# Patient Record
Sex: FEMALE | Race: OTHER | NOT HISPANIC OR LATINO | Employment: UNEMPLOYED | URBAN - METROPOLITAN AREA
[De-identification: names, ages, dates, MRNs, and addresses within clinical notes are randomized per-mention and may not be internally consistent; named-entity substitution may affect disease eponyms.]

---

## 2020-07-10 LAB
C TRACH DNA GENITAL QL NAA+PROBE: NON REACTIVE
HBV SURFACE AG SERPL QL IA: NEGATIVE
RUBV IGG SERPL IA-ACNC: NORMAL
TREPONEMA PALLIDUM IGG+IGM AB [PRESENCE] IN SERUM OR PLASMA BY IMMUNOASSAY: NON REACTIVE

## 2020-09-22 ENCOUNTER — APPOINTMENT (OUTPATIENT)
Dept: OBGYN | Facility: CLINIC | Age: 30
End: 2020-09-22

## 2020-10-05 ENCOUNTER — INITIAL PRENATAL (OUTPATIENT)
Dept: OBGYN | Facility: CLINIC | Age: 30
End: 2020-10-05

## 2020-10-05 VITALS
DIASTOLIC BLOOD PRESSURE: 70 MMHG | BODY MASS INDEX: 34.04 KG/M2 | WEIGHT: 185 LBS | SYSTOLIC BLOOD PRESSURE: 112 MMHG | HEIGHT: 62 IN

## 2020-10-05 DIAGNOSIS — O09.92 ENCOUNTER FOR SUPERVISION OF HIGH RISK PREGNANCY IN SECOND TRIMESTER, ANTEPARTUM: Primary | ICD-10-CM

## 2020-10-05 DIAGNOSIS — Z98.891 HISTORY OF CESAREAN SECTION: ICD-10-CM

## 2020-10-05 PROCEDURE — 8198 PREG CTR PKG RATE (SYSTEM): Performed by: NURSE PRACTITIONER

## 2020-10-05 PROCEDURE — 0500F INITIAL PRENATAL CARE VISIT: CPT | Performed by: NURSE PRACTITIONER

## 2020-10-05 ASSESSMENT — ENCOUNTER SYMPTOMS
RESPIRATORY NEGATIVE: 1
MUSCULOSKELETAL NEGATIVE: 1
CARDIOVASCULAR NEGATIVE: 1
GASTROINTESTINAL NEGATIVE: 1
CONSTITUTIONAL NEGATIVE: 1
PSYCHIATRIC NEGATIVE: 1
EYES NEGATIVE: 1
NEUROLOGICAL NEGATIVE: 1

## 2020-10-05 NOTE — LETTER
"Count Your Baby's Movements  Another step to a healthy delivery    Lay Cooper             Dept: [phone]    How Many Weeks Pregnant? 27w2d    Date to Begin Counting: 10/5/2020              How to use this chart    One way for your physician to keep track of your baby's health is by knowing how often the baby moves (or \"kicks\") in your womb.  You can help your physician to do this by using this chart every day.    Every day, you should see how many hours it takes for your baby to move 10 times.  Start in the morning, as soon as you get up.    · First, write down the time your baby moves until you get to 10.  · Check off one box every time your baby moves until you get to 10.  · Write down the time you finished counting in the last column.  · Total how long it took to count up all 10 movements.  · Finally, fill in the box that shows how long this took.  After counting 10 movements, you no longer have to count any more that day.  The next morning, just start counting again as soon as you get up.    What should you call a \"movement\"?  It is hard to say, because it will feel different from one mother to another and from one pregnancy to the next.  The important thing is that you count the movements the same way throughout your pregnancy.  If you have more questions, you should ask your physician.    Count carefully every day!  SAMPLE:  Week 28    How many hours did it take to feel 10 movements?       Start  Time     1     2     3     4     5     6     7     8     9     10   Finish Time   Mon 8:20 ·  ·  ·  ·  ·  ·  ·  ·  ·  ·  11:40   Tue Wed Thu               Fri               Sat               Sun                 IMPORTANT: You should contact your physician if it takes more than two hours for you to feel 10 movements.  Each morning, write down the time and start to count the movements of your baby.  Keep track by checking off one box every time you feel one movement.  When you have felt 10 " "\"kicks\", write down the time you finished counting in the last column.  Then fill in the   box (over the check michael) for the number of hours it took.  Be sure to read the complete instructions on the previous page.            "

## 2020-10-05 NOTE — PROGRESS NOTES
Pt. Here for NOB visit today.  # [phone]  First prenatal care  Pt. states she is having lower abdominal pain   +FM   Pharmacy verified  AFP, already done   Chaperone offered and not indicated  Last PAP: 7/10/2020, WNL   Pt given VALERIE sheet and instructions  Pt declines BTL  Pt declines TDAP vaccine   Pt declines Flu vaccine

## 2020-10-05 NOTE — PROGRESS NOTES
"Subjective:      S:  Lay Cooper is a 30 y.o. female  @ She is 27w2d with an WARREN of Estimated Date of Delivery: 21 based off of US who presents for her new OB exam.      Her OB hx includes C/S x1 for maternal fatigue in 2nd stage. Unknown scar  History of HSV I or II in self or partner: no  History of STIs in self or partner: no  History of Thyroid problems: no  Hx of positive quantiferon, negative chest xrays. Pt was exposed years ago by older relative    Previous care in this pregnancy in [place], records in media. She reports tiredness and low abd pain.  Neg AFP.  Declines CF.  Reports positive FM, no VB, LOF, or cramping.  Denies dysuria, vaginal DC.  Pt is  and lives with FOB. FOB is suppoortive. She is currently working as homemaker, no heavy lifting, no chemical exposure.  Pregnancy is planned.    O:    Vitals:    10/05/20 0823   BP: 112/70   Weight: 83.9 kg (185 lb)   Height: 1.575 m (5' 2\")    See H&P Prenatal Physical.  Wet mount: not indicated        FHTs: 145        Fundal ht: 31cm        S>D     A:   1.  IUP @ 27w2d WARREN: Estimated Date of Delivery: 21 per US         2.  S=D        3.    Patient Active Problem List    Diagnosis Date Noted   • Encounter for supervision of high risk pregnancy in second trimester, antepartum 10/05/2020   • History of  section, no operative report 10/05/2020         P:  1.  GC/CT neg. Pap neg.         2. 3 T labs ordered - lab slip given        3.  Discussed diet and exercise during pregnancy. Encouraged good nutrition, and daily exercise including walking or swimming. Discussed expected weight gain during pregnancy.              4.  Discussed adequate hydration during pregnancy.        5.  NOB packet given        6.  Return to office in 2 wks with physician        7.  Growth scan for S>D        8.  Pregnancy guide providsied        9.  Childbirth education discussed. Not a candidate for Centering Pregnancy       10. Declines flu vaccine       11. " "Declines TDAP       12. Kick count infor provided       13. Declines BTL  HPI    Review of Systems   Constitutional: Negative.    HENT: Negative.    Eyes: Negative.    Respiratory: Negative.    Cardiovascular: Negative.    Gastrointestinal: Negative.    Genitourinary: Negative.    Musculoskeletal: Negative.    Skin: Negative.    Neurological: Negative.    Endo/Heme/Allergies: Negative.    Psychiatric/Behavioral: Negative.           Objective:     /70   Ht 1.575 m (5' 2\")   Wt 83.9 kg (185 lb)   LMP 2020   BMI 33.84 kg/m²      Physical Exam  Vitals signs and nursing note reviewed.   Constitutional:       Appearance: She is well-developed.   Neck:      Musculoskeletal: Normal range of motion and neck supple.   Cardiovascular:      Rate and Rhythm: Normal rate and regular rhythm.      Heart sounds: Normal heart sounds.   Pulmonary:      Effort: Pulmonary effort is normal.      Breath sounds: Normal breath sounds.   Abdominal:      Palpations: Abdomen is soft.   Genitourinary:     Vagina: Normal.      Comments: Uterus enlarged, c/w 31 wk ga  Musculoskeletal: Normal range of motion.   Skin:     General: Skin is warm and dry.   Neurological:      Mental Status: She is alert and oriented to person, place, and time.      Deep Tendon Reflexes: Reflexes are normal and symmetric.   Psychiatric:         Behavior: Behavior normal.         Thought Content: Thought content normal.         Judgment: Judgment normal.                 Assessment/Plan:        1. Encounter for supervision of high risk pregnancy in second trimester, antepartum    - CBC WITHOUT DIFFERENTIAL; Future  - GLUCOSE 1HR GESTATIONAL; Future  - TREPONEMA PALLIDUM ANTIBODY, IGG BY IFA (CSF); Future  - US-OB LIMITED GROWTH FOLLOW UP; Future    2. History of  section, no operative reportr      "

## 2020-10-12 ENCOUNTER — APPOINTMENT (OUTPATIENT)
Dept: RADIOLOGY | Facility: IMAGING CENTER | Age: 30
End: 2020-10-12
Attending: NURSE PRACTITIONER

## 2020-10-12 DIAGNOSIS — O09.92 ENCOUNTER FOR SUPERVISION OF HIGH RISK PREGNANCY IN SECOND TRIMESTER, ANTEPARTUM: ICD-10-CM

## 2020-10-12 PROCEDURE — 76816 OB US FOLLOW-UP PER FETUS: CPT | Mod: TC | Performed by: NURSE PRACTITIONER

## 2020-10-14 PROBLEM — O36.63X0 EXCESSIVE FETAL GROWTH AFFECTING MANAGEMENT OF PREGNANCY IN THIRD TRIMESTER: Status: ACTIVE | Noted: 2020-10-14

## 2020-10-19 ENCOUNTER — ROUTINE PRENATAL (OUTPATIENT)
Dept: OBGYN | Facility: CLINIC | Age: 30
End: 2020-10-19

## 2020-10-19 VITALS — WEIGHT: 187.3 LBS | BODY MASS INDEX: 34.26 KG/M2 | DIASTOLIC BLOOD PRESSURE: 78 MMHG | SYSTOLIC BLOOD PRESSURE: 100 MMHG

## 2020-10-19 DIAGNOSIS — Z34.83 ENCOUNTER FOR SUPERVISION OF OTHER NORMAL PREGNANCY IN THIRD TRIMESTER: ICD-10-CM

## 2020-10-19 PROCEDURE — 90715 TDAP VACCINE 7 YRS/> IM: CPT | Performed by: OBSTETRICS & GYNECOLOGY

## 2020-10-19 PROCEDURE — 90040 PR PRENATAL FOLLOW UP: CPT | Performed by: OBSTETRICS & GYNECOLOGY

## 2020-10-19 PROCEDURE — 90686 IIV4 VACC NO PRSV 0.5 ML IM: CPT | Performed by: OBSTETRICS & GYNECOLOGY

## 2020-10-19 PROCEDURE — 90472 IMMUNIZATION ADMIN EACH ADD: CPT | Performed by: OBSTETRICS & GYNECOLOGY

## 2020-10-19 PROCEDURE — 90471 IMMUNIZATION ADMIN: CPT | Performed by: OBSTETRICS & GYNECOLOGY

## 2020-10-19 NOTE — PROGRESS NOTES
OB follow up   + fetal movement.  No VB, LOF or UC's.  Tdap tody   Flu vaccine given today Right Deltoid. Screening checklist reviewed with patient. VIS given. Verified by MR  Pt desires  to discuss with  today  Phone # [phone]  Preferred pharmacy confirmed.

## 2020-10-19 NOTE — PROGRESS NOTES
TACOS:  29w2d    Pt reports doing well.  Denies vaginal bleeding, contractions, LOF.  Reports +FM.      /78   Wt 85 kg (187 lb 4.8 oz)   LMP 2020   BMI 34.26 kg/m²   gen: AAO, NAD  FHTs: 125  FH: 29    A/P: 30 y.o.  @ 29w2d      Estimated Date of Delivery: 21 by lmp c/w 11wk US (media)    PNL: Rh+/-, RI, pnl wnl    Aneuploidy screening: none  Anatomy US: normal, post placenta   US 10/12 for S>D: EFW 81.5%    Glucola/3rd tri labs: [ ]   Rhogam: [ ]     Tdap: [ ]  Flu vacccine [ ]     GBS [ ]       Latent TB: + quantiferon with PNL, CXR neg    Hx of c/s x1: 2nd stage arrest        Today, I discussed with Lay Cooper her history of  delivery.  Pt reports she was 36wks and spontaneously labored. She was admitted for labor but reports very little support. She reports she changed from 3.5 to 6cm over approximatly 4hrs then was offered a c/s and accepted because she was worried about waiting longer.  Reports she was told the baby's heart rate was normal and does not think she remained 6cm for a prolonged period of time.  Never made it to 10cm.  That baby weight 8thi9yf.  Pt denies being told that she should never try for a vaginal delivery in future pregnancies.  We discussed the risks/benefits of TOLAC, including uterine rupture (approximately 0.7%) with need for emergent  delivery, risk of maternal hemorrhage requiring blood transfusion or, rarely, hysterectomy, risk of fetal death or permanent hypoxic brain injury) vs repeat  delivery including surgical risks (damage to intraabdominal structures, bleeding, infection, pain), possible need for  delivery in future pregnancies, risk of adherent placenta in future pregnancies.    MFMU calculator chance of successful : 70%  After our discussion, pt would like to TOLAC    Pt expressed desire for female only delivery provider - discussed we do have men in our group of physicians and cannot guarantee this for any of our  patients.  Pt expressed understanding.  Tdap/flu today  Has rx for 3rd tri labs asap      RTC 2 wks    Angella Fonseca MD  [facility], Women's Health

## 2020-11-02 ENCOUNTER — ROUTINE PRENATAL (OUTPATIENT)
Dept: OBGYN | Facility: CLINIC | Age: 30
End: 2020-11-02

## 2020-11-02 VITALS — DIASTOLIC BLOOD PRESSURE: 68 MMHG | SYSTOLIC BLOOD PRESSURE: 112 MMHG | BODY MASS INDEX: 34.61 KG/M2 | WEIGHT: 189.2 LBS

## 2020-11-02 DIAGNOSIS — Z98.891 HISTORY OF CESAREAN SECTION: ICD-10-CM

## 2020-11-02 PROCEDURE — 90040 PR PRENATAL FOLLOW UP: CPT | Performed by: NURSE PRACTITIONER

## 2020-11-02 RX ORDER — ACETAMINOPHEN 325 MG/1
650 TABLET ORAL EVERY 4 HOURS PRN
Status: ON HOLD | COMMUNITY
End: 2020-12-22

## 2020-11-02 NOTE — PROGRESS NOTES
SUBJECTIVE:  Pt is a 30 y.o.   at 31w2d  gestation. Presents today for follow-up prenatal care.  Reports good  fetal movement. Denies regular cramping/contractions, bleeding or leaking of fluid. Denies dysuria, headaches, N/V. Generally feels well today except restless legs at night, heartburn, fatigue, and dyspnea on exertion. Pt described many symptoms that align with anemia, however she has not yet done her third trimester labs.     OBJECTIVE:  - See prenatal vitals flow  -   Vitals:    20 1025   BP: 112/68   Weight: 85.8 kg (189 lb 3.2 oz)                 ASSESSMENT:   - IUP at 31w2d    - S=D   -   Patient Active Problem List    Diagnosis Date Noted   • LGA in 3rd trimester, EFW Percentile: 81.5% on 10/12/20 10/14/2020   • Encounter for supervision of high risk pregnancy in second trimester, antepartum 10/05/2020   • History of  section, no operative report 10/05/2020         PLAN:  - Possible anemia: pt was provided education on iron and anemia and how that can cause the symptoms she is describing. Pt was encouraged to do her labs so that we can have a better understanding of what might be causing her fatigue and dyspnea on exertion. Provided handout on iron-rich foods to include into her diet as well.   - Heartburn: Provided education on heartburn. Lifestyle measures, pharm measures, and holistic measures discussed to reduce, and handouts provided for each.    -Restless leg: encouraged adequate water intake, potassium intake from diet, stretches demonstrated, encouraged warm epsom salt baths, and benadryl as a last resort can be used, but sparingly.    - S/sx pregnancy and labor warning signs vs general discomforts discussed  - Fetal movements and/or kick counts reviewed   - Adequate hydration reinforced  - Nutrition/exercise/vitamin education; continue PNV  - Plans for breast feeding.   - Coping with labor pains reviewed and resources provided   - S/p Tdap vacc   - S/p Flu vacc   -  Anticipatory guidance given  - RTC in 2 weeks for follow-up prenatal care

## 2020-11-02 NOTE — PROGRESS NOTES
.Pt. Here for OB/FU. Reports Good FM.   Good # [phone]  Pt. Denies VB, LOF, or UC's.   Pharmacy verified.   Chaperone offered and not indicated  Pt states that she has been having difficulty breathing

## 2020-11-16 ENCOUNTER — ROUTINE PRENATAL (OUTPATIENT)
Dept: OBGYN | Facility: CLINIC | Age: 30
End: 2020-11-16

## 2020-11-16 VITALS — BODY MASS INDEX: 34.82 KG/M2 | DIASTOLIC BLOOD PRESSURE: 60 MMHG | SYSTOLIC BLOOD PRESSURE: 108 MMHG | WEIGHT: 190.4 LBS

## 2020-11-16 DIAGNOSIS — Z34.83 ENCOUNTER FOR SUPERVISION OF OTHER NORMAL PREGNANCY IN THIRD TRIMESTER: Primary | ICD-10-CM

## 2020-11-16 PROCEDURE — 90040 PR PRENATAL FOLLOW UP: CPT | Performed by: NURSE PRACTITIONER

## 2020-11-16 NOTE — PROGRESS NOTES
S) Pt is a 30 y.o.   at 33w2d  gestation. Routine prenatal care today. Had a couple of BH tightenings but nothing regular or painful.    Fetal movement Normal  Cramping no  VB no  LOF no   Denies dysuria. Generally feels well today. Good self-care activities identified. Denies headaches, swelling, visual changes, or epigastric pain .     O) See flow sheet for vital signs and fetal measurements.          Labs:       PNL: WNL       GCT: has not done       AFP: Not Examined       GBS: N/A       Pertinent ultrasound - 10/12/0 Growth 81.5%            A) IUP at 33w2d       S=D         Patient Active Problem List    Diagnosis Date Noted   • LGA in 3rd trimester, EFW Percentile: 81.5% on 10/12/20 10/14/2020   • Encounter for supervision of high risk pregnancy in second trimester, antepartum 10/05/2020   • History of  section, no operative report 10/05/2020          SVE: deferred         TDAP: yes       FLU: yes        BTL: no       : yes       C/S Consent: no       IOL or C/S scheduled: no               P) s/s ptl vs general discomforts. Fetal movements reviewed. General ed and anticipatory guidance. Nutrition/exercise/vitamin. Plans breast Plans pp contraception- unsure  Continue PNV.   Discussed normalcy of BH ctx in third trimester and when to go to hospital.   Reprinted gtt and encouraged to do ASAP  RTC 2 weeks or PRN

## 2020-11-16 NOTE — PROGRESS NOTES
OB follow up   + fetal movement. Active  No VB, LOF or UC's.  Flu vaccine offered Recieved  Phone # [phone]  Preferred pharmacy confirmed.  No complaints as of today

## 2020-11-23 ENCOUNTER — HOSPITAL ENCOUNTER (OUTPATIENT)
Dept: LAB | Facility: MEDICAL CENTER | Age: 30
End: 2020-11-23
Attending: NURSE PRACTITIONER
Payer: COMMERCIAL

## 2020-11-23 DIAGNOSIS — O09.92 ENCOUNTER FOR SUPERVISION OF HIGH RISK PREGNANCY IN SECOND TRIMESTER, ANTEPARTUM: ICD-10-CM

## 2020-11-23 PROBLEM — O99.019 ANEMIA IN PREGNANCY: Status: ACTIVE | Noted: 2020-11-23

## 2020-11-23 LAB
ERYTHROCYTE [DISTWIDTH] IN BLOOD BY AUTOMATED COUNT: 42.9 FL (ref 35.9–50)
GLUCOSE 1H P 50 G GLC PO SERPL-MCNC: 133 MG/DL (ref 70–139)
HCT VFR BLD AUTO: 29.2 % (ref 37–47)
HGB BLD-MCNC: 8.7 G/DL (ref 12–16)
MCH RBC QN AUTO: 21.8 PG (ref 27–33)
MCHC RBC AUTO-ENTMCNC: 29.8 G/DL (ref 33.6–35)
MCV RBC AUTO: 73.2 FL (ref 81.4–97.8)
PLATELET # BLD AUTO: 277 K/UL (ref 164–446)
PMV BLD AUTO: 11.1 FL (ref 9–12.9)
RBC # BLD AUTO: 3.99 M/UL (ref 4.2–5.4)
TREPONEMA PALLIDUM IGG+IGM AB [PRESENCE] IN SERUM OR PLASMA BY IMMUNOASSAY: NORMAL
WBC # BLD AUTO: 8 K/UL (ref 4.8–10.8)

## 2020-11-24 ENCOUNTER — TELEPHONE (OUTPATIENT)
Dept: OBGYN | Facility: CLINIC | Age: 30
End: 2020-11-24

## 2020-11-25 NOTE — TELEPHONE ENCOUNTER
----- Message from LEANN Santiago sent at 11/24/2020 10:48 AM PST -----  Pt has anemia, will need to begin iron supplements, take with orange juice for better absorption, should avoid taking milk, calcium or antacids at the same time.      Pt notified of need to start on Iron supplementation to take 325 mg BID. Recommended to take it with orange juice, to avoid milk products, calcium or antacids 1hr before and 2hr after. Not to take with PNV. To increase water intake to decrease side effects of constipation. Pt verbalized understanding.

## 2020-12-01 ENCOUNTER — ROUTINE PRENATAL (OUTPATIENT)
Dept: OBGYN | Facility: CLINIC | Age: 30
End: 2020-12-01

## 2020-12-01 VITALS — DIASTOLIC BLOOD PRESSURE: 70 MMHG | BODY MASS INDEX: 36.4 KG/M2 | WEIGHT: 199 LBS | SYSTOLIC BLOOD PRESSURE: 116 MMHG

## 2020-12-01 DIAGNOSIS — O09.92 ENCOUNTER FOR SUPERVISION OF HIGH RISK PREGNANCY IN SECOND TRIMESTER, ANTEPARTUM: ICD-10-CM

## 2020-12-01 PROCEDURE — 90040 PR PRENATAL FOLLOW UP: CPT | Performed by: PHYSICIAN ASSISTANT

## 2020-12-01 NOTE — PROGRESS NOTES
Pt here today for OB follow up  Pt states she is feeling heavy   Reports +FM  Good # [phone]  Pharmacy Confirmed.  Chaperone offered and not indicated.

## 2020-12-01 NOTE — PROGRESS NOTES
"Pt has no complaints with cramping, UCs, VB, LOF, though pt has had some UCs over past week. +FM. GBS next visit. PTL precautions reviewed as pt has hx of PTL at 36wk. Pt had C/S due to FTP and \"maternal exhaustion\" with 14 hours of labor, desires TOLAC. D/w pt plan for delivery, though no op note in media. Pt has seen Dr Sethi in early pregnancy and was approved for TOLAC. Daily FKC recommended and pt urged to incr water intake, as she is only \"drinking water when thirsty now.\" Also, pt using Prep H with good relief for hemorrhoids over past week, denies constipation. RTC 1 wk or sooner prn.   "

## 2020-12-10 ENCOUNTER — ROUTINE PRENATAL (OUTPATIENT)
Dept: OBGYN | Facility: CLINIC | Age: 30
End: 2020-12-10

## 2020-12-10 ENCOUNTER — HOSPITAL ENCOUNTER (OUTPATIENT)
Facility: MEDICAL CENTER | Age: 30
End: 2020-12-10
Attending: NURSE PRACTITIONER
Payer: COMMERCIAL

## 2020-12-10 VITALS — SYSTOLIC BLOOD PRESSURE: 132 MMHG | DIASTOLIC BLOOD PRESSURE: 84 MMHG | BODY MASS INDEX: 37.22 KG/M2 | WEIGHT: 203.5 LBS

## 2020-12-10 DIAGNOSIS — O09.92 ENCOUNTER FOR SUPERVISION OF HIGH RISK PREGNANCY IN SECOND TRIMESTER, ANTEPARTUM: ICD-10-CM

## 2020-12-10 DIAGNOSIS — O09.92 ENCOUNTER FOR SUPERVISION OF HIGH RISK PREGNANCY IN SECOND TRIMESTER, ANTEPARTUM: Primary | ICD-10-CM

## 2020-12-10 DIAGNOSIS — Z98.891 HISTORY OF CESAREAN SECTION: ICD-10-CM

## 2020-12-10 PROCEDURE — 90040 PR PRENATAL FOLLOW UP: CPT | Performed by: NURSE PRACTITIONER

## 2020-12-10 NOTE — PATIENT INSTRUCTIONS
P:  1.  GBS obtained.          2.  Labor precautions given.  Instructions given on where to go.  Pt receptive to              education.          3.  Questions answered.          4.  Continue FKCs.          5.  Encouraged adequate water intake        6.  F/u 1 wk.        7.  Repeat CS ordered.        8.  L&D policies reviewed w pt.         9.  IOL/RCS message placed to schedulers for WARREN.

## 2020-12-10 NOTE — PROGRESS NOTES
S:  Pt is  at 36w5d here for routine OB follow up.  Reports some skin irritation when she used the hair removing cream this morning.  Also reports her hands and feet are falling asleep, swollen.  Reports that she feels occ UCs.  Reports good FM.  Denies VB, LOF, RUCs, or vaginal DC.     O:    Vitals:    12/10/20 1105   BP: 132/84   Weight: 92.3 kg (203 lb 8 oz)           FHTs: 150        Fundal ht: 38 cm.        Fetal position: vertex.    A:  IUP at 36w5d  Patient Active Problem List    Diagnosis Date Noted   • Anemia in pregnancy 2020   • LGA in 3rd trimester, EFW Percentile: 81.5% on 10/12/20 10/14/2020   • Encounter for supervision of high risk pregnancy in second trimester, antepartum 10/05/2020   • History of C/S, no operative report - desires TOLAC 10/05/2020       P:  1.  GBS obtained.          2.  Labor precautions given.  Instructions given on where to go.  Pt receptive to              education.          3.  Questions answered.          4.  Continue FKCs.          5.  Encouraged adequate water intake        6.  F/u 1 wk.        7.  Repeat CS ordered.        8.  L&D policies reviewed w pt.         9.  IOL/RCS message placed to schedulers for WARREN.        10.  D/w pt helps for skin irritation, discouraged hair removal cream in the future.    Chaperone offered and provided by Viky Frederick MA.

## 2020-12-10 NOTE — PROGRESS NOTES
Pt. Here for OB/FU. Reports Good FM.   Good # [phone]  Pt. Denies VB, LOF, or UC's.   Pharmacy verified.   Chaperone offered and indicated  Pt states having some contractions. Pt also states that she had a bad reactions to hair removal cream   GBS today

## 2020-12-12 LAB — GP B STREP DNA SPEC QL NAA+PROBE: NEGATIVE

## 2020-12-16 ENCOUNTER — ROUTINE PRENATAL (OUTPATIENT)
Dept: OBGYN | Facility: CLINIC | Age: 30
End: 2020-12-16

## 2020-12-16 VITALS — DIASTOLIC BLOOD PRESSURE: 78 MMHG | WEIGHT: 205 LBS | BODY MASS INDEX: 37.49 KG/M2 | SYSTOLIC BLOOD PRESSURE: 124 MMHG

## 2020-12-16 DIAGNOSIS — O36.8130 DECREASED FETAL MOVEMENTS IN THIRD TRIMESTER, SINGLE OR UNSPECIFIED FETUS: ICD-10-CM

## 2020-12-16 DIAGNOSIS — Z98.891 HISTORY OF CESAREAN SECTION: ICD-10-CM

## 2020-12-16 DIAGNOSIS — O09.92 ENCOUNTER FOR SUPERVISION OF HIGH RISK PREGNANCY IN SECOND TRIMESTER, ANTEPARTUM: Primary | ICD-10-CM

## 2020-12-16 PROCEDURE — 90040 PR PRENATAL FOLLOW UP: CPT | Performed by: NURSE PRACTITIONER

## 2020-12-16 NOTE — PROGRESS NOTES
S:  Pt is  at 37w4d here for routine OB follow up.  Reports a lot of pelvic pain and pressure.  Says she feels her belly get tight every 15 min.  Reports decr FM.  Denies VB, LOF, RUCs, or vaginal DC. Denies cough, SOB, sore throat or fever.  Denies exposure to anyone with COVID 19.  Desires SVE today.    O:    Vitals:    20 1048   BP: 124/78   Weight: 93 kg (205 lb)           FHTs: 140        Fundal ht: 39        Fetal position: vertex        SVE: /50/-2        GBS neg on 12/10/20 -- reviewed w pt.      A:  IUP at 37w4d  Reactive NST  Patient Active Problem List    Diagnosis Date Noted   • Anemia in pregnancy 2020   • LGA in 3rd trimester, EFW Percentile: 81.5% on 10/12/20 10/14/2020   • Encounter for supervision of high risk pregnancy in second trimester, antepartum 10/05/2020   • History of C/S, no operative report - desires TOLAC 10/05/2020       P:  1.  Continue FKCs.         2.  Labor precautions given.  Instructions given on where to go.  Pt receptive to education.         3.  D/w pt IOL/TOLAC policy.  IOL scheduled 21 @ 9am.        4.  Questions answered.         5.  Encouraged adequate water intake       6.  F/u 1wk       7.  Encouraged good hand hygiene, social distancing and avoiding sick contacts.       8.  L&D policies reviewed.    Chaperone offered and provided by Viky Frederick MA.

## 2020-12-16 NOTE — PROGRESS NOTES
Pt. Here for OB/FU. Reports Good FM.   Good # [phone]  Pt. Denies VB, LOF, or UC's.   Pharmacy verified.   Chaperone offered and not indicated  Pt states that she is having some discharge, decreased fetal movement.  Patient is scheduled for IOL on 1/2/21 at 9am, pt is aware

## 2020-12-16 NOTE — PATIENT INSTRUCTIONS
P:  1.  Continue FKCs.         2.  Labor precautions given.  Instructions given on where to go.  Pt receptive to education.         3.  D/w pt IOL/TOLAC policy.  IOL scheduled 1/2/21 @ 9am.        4.  Questions answered.         5.  Encouraged adequate water intake       6.  F/u 1wk       7.  Encouraged good hand hygiene, social distancing and avoiding sick contacts.       8.  L&D policies reviewed.

## 2020-12-19 ENCOUNTER — HOSPITAL ENCOUNTER (INPATIENT)
Facility: MEDICAL CENTER | Age: 30
LOS: 2 days | End: 2020-12-22
Attending: OBSTETRICS & GYNECOLOGY | Admitting: OBSTETRICS & GYNECOLOGY
Payer: COMMERCIAL

## 2020-12-19 LAB
COVID ORDER STATUS COVID19: NORMAL
ERYTHROCYTE [DISTWIDTH] IN BLOOD BY AUTOMATED COUNT: 43.9 FL (ref 35.9–50)
HCT VFR BLD AUTO: 28.5 % (ref 37–47)
HGB BLD-MCNC: 8.5 G/DL (ref 12–16)
MCH RBC QN AUTO: 20.6 PG (ref 27–33)
MCHC RBC AUTO-ENTMCNC: 29.8 G/DL (ref 33.6–35)
MCV RBC AUTO: 69.2 FL (ref 81.4–97.8)
PLATELET # BLD AUTO: 279 K/UL (ref 164–446)
PMV BLD AUTO: 10.3 FL (ref 9–12.9)
RBC # BLD AUTO: 4.12 M/UL (ref 4.2–5.4)
WBC # BLD AUTO: 8.5 K/UL (ref 4.8–10.8)

## 2020-12-19 PROCEDURE — 80053 COMPREHEN METABOLIC PANEL: CPT

## 2020-12-19 PROCEDURE — C9803 HOPD COVID-19 SPEC COLLECT: HCPCS | Performed by: NURSE PRACTITIONER

## 2020-12-19 PROCEDURE — 85027 COMPLETE CBC AUTOMATED: CPT

## 2020-12-19 PROCEDURE — 84550 ASSAY OF BLOOD/URIC ACID: CPT

## 2020-12-19 PROCEDURE — 87426 SARSCOV CORONAVIRUS AG IA: CPT

## 2020-12-20 LAB
ABO + RH BLD: NORMAL
ABO GROUP BLD: NORMAL
ALBUMIN SERPL BCP-MCNC: 3.5 G/DL (ref 3.2–4.9)
ALBUMIN/GLOB SERPL: 1.2 G/DL
ALP SERPL-CCNC: 192 U/L (ref 30–99)
ALT SERPL-CCNC: 8 U/L (ref 2–50)
ANION GAP SERPL CALC-SCNC: 13 MMOL/L (ref 7–16)
AST SERPL-CCNC: 13 U/L (ref 12–45)
BARCODED ABORH UBTYP: 5100
BARCODED ABORH UBTYP: 5100
BARCODED PRD CODE UBPRD: NORMAL
BARCODED PRD CODE UBPRD: NORMAL
BARCODED UNIT NUM UBUNT: NORMAL
BARCODED UNIT NUM UBUNT: NORMAL
BASOPHILS # BLD AUTO: 0.2 % (ref 0–1.8)
BASOPHILS # BLD: 0.02 K/UL (ref 0–0.12)
BILIRUB SERPL-MCNC: 0.7 MG/DL (ref 0.1–1.5)
BLD GP AB SCN SERPL QL: NORMAL
BUN SERPL-MCNC: 7 MG/DL (ref 8–22)
CALCIUM SERPL-MCNC: 8.7 MG/DL (ref 8.5–10.5)
CHLORIDE SERPL-SCNC: 104 MMOL/L (ref 96–112)
CO2 SERPL-SCNC: 18 MMOL/L (ref 20–33)
COMPONENT R 8504R: NORMAL
COMPONENT R 8504R: NORMAL
CREAT SERPL-MCNC: 0.51 MG/DL (ref 0.5–1.4)
CREAT UR-MCNC: 41.72 MG/DL
EOSINOPHIL # BLD AUTO: 0.01 K/UL (ref 0–0.51)
EOSINOPHIL NFR BLD: 0.1 % (ref 0–6.9)
ERYTHROCYTE [DISTWIDTH] IN BLOOD BY AUTOMATED COUNT: 43.4 FL (ref 35.9–50)
GLOBULIN SER CALC-MCNC: 2.9 G/DL (ref 1.9–3.5)
GLUCOSE SERPL-MCNC: 87 MG/DL (ref 65–99)
HCT VFR BLD AUTO: 21.5 % (ref 37–47)
HCT VFR BLD AUTO: 27 % (ref 37–47)
HGB BLD-MCNC: 6.3 G/DL (ref 12–16)
HGB BLD-MCNC: 8.5 G/DL (ref 12–16)
IMM GRANULOCYTES # BLD AUTO: 0.1 K/UL (ref 0–0.11)
IMM GRANULOCYTES NFR BLD AUTO: 0.8 % (ref 0–0.9)
LYMPHOCYTES # BLD AUTO: 1.74 K/UL (ref 1–4.8)
LYMPHOCYTES NFR BLD: 13.2 % (ref 22–41)
MAGNESIUM SERPL-MCNC: 3.9 MG/DL (ref 1.5–2.5)
MAGNESIUM SERPL-MCNC: 5.2 MG/DL (ref 1.5–2.5)
MAGNESIUM SERPL-MCNC: 5.7 MG/DL (ref 1.5–2.5)
MCH RBC QN AUTO: 19.9 PG (ref 27–33)
MCHC RBC AUTO-ENTMCNC: 28.8 G/DL (ref 33.6–35)
MCV RBC AUTO: 68.9 FL (ref 81.4–97.8)
MONOCYTES # BLD AUTO: 0.72 K/UL (ref 0–0.85)
MONOCYTES NFR BLD AUTO: 5.5 % (ref 0–13.4)
MORPHOLOGY BLD-IMP: NORMAL
NEUTROPHILS # BLD AUTO: 10.57 K/UL (ref 2–7.15)
NEUTROPHILS NFR BLD: 80.2 % (ref 44–72)
NRBC # BLD AUTO: 0.02 K/UL
NRBC BLD-RTO: 0.2 /100 WBC
PLATELET # BLD AUTO: 256 K/UL (ref 164–446)
PMV BLD AUTO: 10.3 FL (ref 9–12.9)
POTASSIUM SERPL-SCNC: 4 MMOL/L (ref 3.6–5.5)
PRODUCT TYPE UPROD: NORMAL
PRODUCT TYPE UPROD: NORMAL
PROT SERPL-MCNC: 6.4 G/DL (ref 6–8.2)
PROT UR-MCNC: 71 MG/DL (ref 0–15)
PROT/CREAT UR: 1702 MG/G (ref 10–107)
RBC # BLD AUTO: 3.12 M/UL (ref 4.2–5.4)
RH BLD: NORMAL
SARS-COV+SARS-COV-2 AG RESP QL IA.RAPID: NOTDETECTED
SODIUM SERPL-SCNC: 135 MMOL/L (ref 135–145)
SPECIMEN SOURCE: NORMAL
UNIT STATUS USTAT: NORMAL
UNIT STATUS USTAT: NORMAL
URATE SERPL-MCNC: 5.5 MG/DL (ref 1.9–8.2)
WBC # BLD AUTO: 13.2 K/UL (ref 4.8–10.8)

## 2020-12-20 PROCEDURE — 86901 BLOOD TYPING SEROLOGIC RH(D): CPT

## 2020-12-20 PROCEDURE — 36415 COLL VENOUS BLD VENIPUNCTURE: CPT

## 2020-12-20 PROCEDURE — 86900 BLOOD TYPING SEROLOGIC ABO: CPT

## 2020-12-20 PROCEDURE — 770002 HCHG ROOM/CARE - OB PRIVATE (112)

## 2020-12-20 PROCEDURE — 700101 HCHG RX REV CODE 250: Performed by: OBSTETRICS & GYNECOLOGY

## 2020-12-20 PROCEDURE — 700105 HCHG RX REV CODE 258: Performed by: OBSTETRICS & GYNECOLOGY

## 2020-12-20 PROCEDURE — 700102 HCHG RX REV CODE 250 W/ 637 OVERRIDE(OP): Performed by: NURSE PRACTITIONER

## 2020-12-20 PROCEDURE — 700102 HCHG RX REV CODE 250 W/ 637 OVERRIDE(OP)

## 2020-12-20 PROCEDURE — 0UQMXZZ REPAIR VULVA, EXTERNAL APPROACH: ICD-10-PCS | Performed by: OBSTETRICS & GYNECOLOGY

## 2020-12-20 PROCEDURE — 85014 HEMATOCRIT: CPT

## 2020-12-20 PROCEDURE — 700101 HCHG RX REV CODE 250

## 2020-12-20 PROCEDURE — 86850 RBC ANTIBODY SCREEN: CPT

## 2020-12-20 PROCEDURE — A9270 NON-COVERED ITEM OR SERVICE: HCPCS

## 2020-12-20 PROCEDURE — 304965 HCHG RECOVERY SERVICES

## 2020-12-20 PROCEDURE — 84156 ASSAY OF PROTEIN URINE: CPT

## 2020-12-20 PROCEDURE — 83735 ASSAY OF MAGNESIUM: CPT | Mod: 91

## 2020-12-20 PROCEDURE — 30233N1 TRANSFUSION OF NONAUTOLOGOUS RED BLOOD CELLS INTO PERIPHERAL VEIN, PERCUTANEOUS APPROACH: ICD-10-PCS | Performed by: OBSTETRICS & GYNECOLOGY

## 2020-12-20 PROCEDURE — 59409 OBSTETRICAL CARE: CPT

## 2020-12-20 PROCEDURE — 36430 TRANSFUSION BLD/BLD COMPNT: CPT

## 2020-12-20 PROCEDURE — 700105 HCHG RX REV CODE 258: Performed by: NURSE PRACTITIONER

## 2020-12-20 PROCEDURE — A9270 NON-COVERED ITEM OR SERVICE: HCPCS | Performed by: NURSE PRACTITIONER

## 2020-12-20 PROCEDURE — 700111 HCHG RX REV CODE 636 W/ 250 OVERRIDE (IP): Performed by: OBSTETRICS & GYNECOLOGY

## 2020-12-20 PROCEDURE — 85018 HEMOGLOBIN: CPT

## 2020-12-20 PROCEDURE — 10907ZC DRAINAGE OF AMNIOTIC FLUID, THERAPEUTIC FROM PRODUCTS OF CONCEPTION, VIA NATURAL OR ARTIFICIAL OPENING: ICD-10-PCS | Performed by: OBSTETRICS & GYNECOLOGY

## 2020-12-20 PROCEDURE — 82570 ASSAY OF URINE CREATININE: CPT

## 2020-12-20 PROCEDURE — 85025 COMPLETE CBC W/AUTO DIFF WBC: CPT

## 2020-12-20 PROCEDURE — 700105 HCHG RX REV CODE 258: Performed by: STUDENT IN AN ORGANIZED HEALTH CARE EDUCATION/TRAINING PROGRAM

## 2020-12-20 PROCEDURE — P9016 RBC LEUKOCYTES REDUCED: HCPCS | Mod: 91

## 2020-12-20 PROCEDURE — 86923 COMPATIBILITY TEST ELECTRIC: CPT

## 2020-12-20 PROCEDURE — 700111 HCHG RX REV CODE 636 W/ 250 OVERRIDE (IP): Performed by: NURSE PRACTITIONER

## 2020-12-20 RX ORDER — DOCUSATE SODIUM 100 MG/1
100 CAPSULE, LIQUID FILLED ORAL 2 TIMES DAILY PRN
Status: DISCONTINUED | OUTPATIENT
Start: 2020-12-20 | End: 2020-12-22 | Stop reason: HOSPADM

## 2020-12-20 RX ORDER — IBUPROFEN 600 MG/1
600 TABLET ORAL EVERY 6 HOURS PRN
Status: DISCONTINUED | OUTPATIENT
Start: 2020-12-20 | End: 2020-12-20

## 2020-12-20 RX ORDER — MAGNESIUM SULFATE HEPTAHYDRATE 40 MG/ML
4 INJECTION, SOLUTION INTRAVENOUS ONCE
Status: COMPLETED | OUTPATIENT
Start: 2020-12-20 | End: 2020-12-20

## 2020-12-20 RX ORDER — METHYLERGONOVINE MALEATE 0.2 MG/ML
0.2 INJECTION INTRAVENOUS
Status: DISCONTINUED | OUTPATIENT
Start: 2020-12-20 | End: 2020-12-20 | Stop reason: HOSPADM

## 2020-12-20 RX ORDER — NIFEDIPINE 30 MG/1
30 TABLET, EXTENDED RELEASE ORAL
Status: DISCONTINUED | OUTPATIENT
Start: 2020-12-20 | End: 2020-12-22 | Stop reason: HOSPADM

## 2020-12-20 RX ORDER — SODIUM CHLORIDE 9 MG/ML
INJECTION, SOLUTION INTRAVENOUS CONTINUOUS
Status: ACTIVE | OUTPATIENT
Start: 2020-12-20 | End: 2020-12-21

## 2020-12-20 RX ORDER — IBUPROFEN 800 MG/1
800 TABLET ORAL EVERY 6 HOURS PRN
Status: DISCONTINUED | OUTPATIENT
Start: 2020-12-20 | End: 2020-12-22 | Stop reason: HOSPADM

## 2020-12-20 RX ORDER — NIFEDIPINE 10 MG/1
10 CAPSULE ORAL ONCE
Status: COMPLETED | OUTPATIENT
Start: 2020-12-20 | End: 2020-12-20

## 2020-12-20 RX ORDER — LABETALOL HYDROCHLORIDE 5 MG/ML
20-80 INJECTION, SOLUTION INTRAVENOUS PRN
Status: DISCONTINUED | OUTPATIENT
Start: 2020-12-20 | End: 2020-12-20 | Stop reason: HOSPADM

## 2020-12-20 RX ORDER — SODIUM CHLORIDE, SODIUM LACTATE, POTASSIUM CHLORIDE, CALCIUM CHLORIDE 600; 310; 30; 20 MG/100ML; MG/100ML; MG/100ML; MG/100ML
INJECTION, SOLUTION INTRAVENOUS CONTINUOUS
Status: ACTIVE | OUTPATIENT
Start: 2020-12-20 | End: 2020-12-20

## 2020-12-20 RX ORDER — MAGNESIUM SULFATE HEPTAHYDRATE 40 MG/ML
2 INJECTION, SOLUTION INTRAVENOUS CONTINUOUS
Status: DISCONTINUED | OUTPATIENT
Start: 2020-12-20 | End: 2020-12-21

## 2020-12-20 RX ORDER — OXYCODONE HYDROCHLORIDE AND ACETAMINOPHEN 5; 325 MG/1; MG/1
1 TABLET ORAL EVERY 4 HOURS PRN
Status: DISCONTINUED | OUTPATIENT
Start: 2020-12-20 | End: 2020-12-22 | Stop reason: HOSPADM

## 2020-12-20 RX ORDER — SODIUM CHLORIDE 9 MG/ML
INJECTION, SOLUTION INTRAVENOUS CONTINUOUS
Status: ACTIVE | OUTPATIENT
Start: 2020-12-20 | End: 2020-12-20

## 2020-12-20 RX ORDER — CALCIUM GLUCONATE 94 MG/ML
1 INJECTION, SOLUTION INTRAVENOUS
Status: DISCONTINUED | OUTPATIENT
Start: 2020-12-19 | End: 2020-12-20

## 2020-12-20 RX ORDER — LIDOCAINE HYDROCHLORIDE 10 MG/ML
INJECTION, SOLUTION INFILTRATION; PERINEURAL
Status: COMPLETED
Start: 2020-12-20 | End: 2020-12-20

## 2020-12-20 RX ORDER — BISACODYL 10 MG
10 SUPPOSITORY, RECTAL RECTAL PRN
Status: DISCONTINUED | OUTPATIENT
Start: 2020-12-20 | End: 2020-12-22 | Stop reason: HOSPADM

## 2020-12-20 RX ORDER — OXYCODONE HYDROCHLORIDE AND ACETAMINOPHEN 5; 325 MG/1; MG/1
2 TABLET ORAL EVERY 4 HOURS PRN
Status: DISCONTINUED | OUTPATIENT
Start: 2020-12-20 | End: 2020-12-22 | Stop reason: HOSPADM

## 2020-12-20 RX ORDER — ALUMINA, MAGNESIA, AND SIMETHICONE 2400; 2400; 240 MG/30ML; MG/30ML; MG/30ML
30 SUSPENSION ORAL EVERY 6 HOURS PRN
Status: DISCONTINUED | OUTPATIENT
Start: 2020-12-20 | End: 2020-12-20 | Stop reason: HOSPADM

## 2020-12-20 RX ORDER — MISOPROSTOL 200 UG/1
TABLET ORAL
Status: COMPLETED
Start: 2020-12-20 | End: 2020-12-20

## 2020-12-20 RX ORDER — ONDANSETRON 2 MG/ML
4 INJECTION INTRAMUSCULAR; INTRAVENOUS EVERY 6 HOURS PRN
Status: DISCONTINUED | OUTPATIENT
Start: 2020-12-19 | End: 2020-12-20

## 2020-12-20 RX ORDER — MAGNESIUM SULFATE HEPTAHYDRATE 40 MG/ML
2 INJECTION, SOLUTION INTRAVENOUS CONTINUOUS
Status: DISCONTINUED | OUTPATIENT
Start: 2020-12-20 | End: 2020-12-20

## 2020-12-20 RX ORDER — ONDANSETRON 4 MG/1
4 TABLET, ORALLY DISINTEGRATING ORAL EVERY 6 HOURS PRN
Status: DISCONTINUED | OUTPATIENT
Start: 2020-12-20 | End: 2020-12-22 | Stop reason: HOSPADM

## 2020-12-20 RX ORDER — ONDANSETRON 2 MG/ML
4 INJECTION INTRAMUSCULAR; INTRAVENOUS EVERY 6 HOURS PRN
Status: DISCONTINUED | OUTPATIENT
Start: 2020-12-20 | End: 2020-12-22 | Stop reason: HOSPADM

## 2020-12-20 RX ORDER — MAGNESIUM SULFATE HEPTAHYDRATE 40 MG/ML
2 INJECTION, SOLUTION INTRAVENOUS ONCE
Status: COMPLETED | OUTPATIENT
Start: 2020-12-20 | End: 2020-12-20

## 2020-12-20 RX ORDER — SODIUM CHLORIDE, SODIUM LACTATE, POTASSIUM CHLORIDE, CALCIUM CHLORIDE 600; 310; 30; 20 MG/100ML; MG/100ML; MG/100ML; MG/100ML
INJECTION, SOLUTION INTRAVENOUS PRN
Status: DISCONTINUED | OUTPATIENT
Start: 2020-12-20 | End: 2020-12-22 | Stop reason: HOSPADM

## 2020-12-20 RX ORDER — HYDRALAZINE HYDROCHLORIDE 20 MG/ML
5-10 INJECTION INTRAMUSCULAR; INTRAVENOUS PRN
Status: DISCONTINUED | OUTPATIENT
Start: 2020-12-20 | End: 2020-12-20 | Stop reason: HOSPADM

## 2020-12-20 RX ORDER — ROPIVACAINE HYDROCHLORIDE 2 MG/ML
INJECTION, SOLUTION EPIDURAL; INFILTRATION; PERINEURAL
Status: ACTIVE
Start: 2020-12-20 | End: 2020-12-20

## 2020-12-20 RX ORDER — HYDROXYZINE 50 MG/1
50 TABLET, FILM COATED ORAL EVERY 6 HOURS PRN
Status: DISCONTINUED | OUTPATIENT
Start: 2020-12-20 | End: 2020-12-20 | Stop reason: HOSPADM

## 2020-12-20 RX ORDER — OXYCODONE HYDROCHLORIDE AND ACETAMINOPHEN 5; 325 MG/1; MG/1
1 TABLET ORAL EVERY 4 HOURS PRN
Status: DISCONTINUED | OUTPATIENT
Start: 2020-12-20 | End: 2020-12-20

## 2020-12-20 RX ORDER — SODIUM CHLORIDE, SODIUM LACTATE, POTASSIUM CHLORIDE, CALCIUM CHLORIDE 600; 310; 30; 20 MG/100ML; MG/100ML; MG/100ML; MG/100ML
INJECTION, SOLUTION INTRAVENOUS CONTINUOUS
Status: DISCONTINUED | OUTPATIENT
Start: 2020-12-20 | End: 2020-12-20

## 2020-12-20 RX ORDER — MISOPROSTOL 200 UG/1
600 TABLET ORAL
Status: DISCONTINUED | OUTPATIENT
Start: 2020-12-20 | End: 2020-12-22 | Stop reason: HOSPADM

## 2020-12-20 RX ADMIN — OXYCODONE HYDROCHLORIDE AND ACETAMINOPHEN 1 TABLET: 5; 325 TABLET ORAL at 05:17

## 2020-12-20 RX ADMIN — DOCUSATE SODIUM 100 MG: 100 CAPSULE, LIQUID FILLED ORAL at 19:56

## 2020-12-20 RX ADMIN — MAGNESIUM SULFATE HEPTAHYDRATE 2 G/HR: 40 INJECTION, SOLUTION INTRAVENOUS at 20:58

## 2020-12-20 RX ADMIN — NIFEDIPINE 30 MG: 30 TABLET, EXTENDED RELEASE ORAL at 15:02

## 2020-12-20 RX ADMIN — SODIUM CHLORIDE: 9 INJECTION, SOLUTION INTRAVENOUS at 15:15

## 2020-12-20 RX ADMIN — LABETALOL HYDROCHLORIDE 20 MG: 5 INJECTION, SOLUTION INTRAVENOUS at 02:48

## 2020-12-20 RX ADMIN — MAGNESIUM SULFATE HEPTAHYDRATE 2 G/HR: 40 INJECTION, SOLUTION INTRAVENOUS at 00:46

## 2020-12-20 RX ADMIN — Medication 2000 ML/HR: at 02:04

## 2020-12-20 RX ADMIN — MAGNESIUM SULFATE 2 G: 2 INJECTION INTRAVENOUS at 00:34

## 2020-12-20 RX ADMIN — IBUPROFEN 600 MG: 600 TABLET, FILM COATED ORAL at 05:17

## 2020-12-20 RX ADMIN — MISOPROSTOL 800 MCG: 200 TABLET ORAL at 02:27

## 2020-12-20 RX ADMIN — OXYCODONE HYDROCHLORIDE AND ACETAMINOPHEN 1 TABLET: 5; 325 TABLET ORAL at 19:56

## 2020-12-20 RX ADMIN — LABETALOL HYDROCHLORIDE 20 MG: 5 INJECTION, SOLUTION INTRAVENOUS at 00:08

## 2020-12-20 RX ADMIN — FENTANYL CITRATE 100 MCG: 50 INJECTION, SOLUTION INTRAMUSCULAR; INTRAVENOUS at 02:16

## 2020-12-20 RX ADMIN — OXYTOCIN 125 ML/HR: 10 INJECTION, SOLUTION INTRAMUSCULAR; INTRAVENOUS at 02:43

## 2020-12-20 RX ADMIN — NIFEDIPINE 10 MG: 10 CAPSULE ORAL at 06:24

## 2020-12-20 RX ADMIN — MAGNESIUM SULFATE IN WATER 4 G: 40 INJECTION, SOLUTION INTRAVENOUS at 00:12

## 2020-12-20 RX ADMIN — SODIUM CHLORIDE, POTASSIUM CHLORIDE, SODIUM LACTATE AND CALCIUM CHLORIDE: 600; 310; 30; 20 INJECTION, SOLUTION INTRAVENOUS at 11:50

## 2020-12-20 RX ADMIN — ONDANSETRON 4 MG: 2 INJECTION INTRAMUSCULAR; INTRAVENOUS at 00:11

## 2020-12-20 RX ADMIN — LIDOCAINE HYDROCHLORIDE: 10 INJECTION, SOLUTION INFILTRATION; PERINEURAL at 02:18

## 2020-12-20 RX ADMIN — SODIUM CHLORIDE, POTASSIUM CHLORIDE, SODIUM LACTATE AND CALCIUM CHLORIDE: 600; 310; 30; 20 INJECTION, SOLUTION INTRAVENOUS at 00:09

## 2020-12-20 RX ADMIN — IBUPROFEN 800 MG: 800 TABLET, FILM COATED ORAL at 23:00

## 2020-12-20 RX ADMIN — SODIUM CHLORIDE: 9 INJECTION, SOLUTION INTRAVENOUS at 11:53

## 2020-12-20 RX ADMIN — OXYCODONE HYDROCHLORIDE AND ACETAMINOPHEN 1 TABLET: 5; 325 TABLET ORAL at 12:23

## 2020-12-20 SDOH — ECONOMIC STABILITY: TRANSPORTATION INSECURITY
IN THE PAST 12 MONTHS, HAS LACK OF TRANSPORTATION KEPT YOU FROM MEETINGS, WORK, OR FROM GETTING THINGS NEEDED FOR DAILY LIVING?: PATIENT DECLINED

## 2020-12-20 SDOH — ECONOMIC STABILITY: TRANSPORTATION INSECURITY
IN THE PAST 12 MONTHS, HAS THE LACK OF TRANSPORTATION KEPT YOU FROM MEDICAL APPOINTMENTS OR FROM GETTING MEDICATIONS?: PATIENT DECLINED

## 2020-12-20 SDOH — ECONOMIC STABILITY: FOOD INSECURITY: WITHIN THE PAST 12 MONTHS, YOU WORRIED THAT YOUR FOOD WOULD RUN OUT BEFORE YOU GOT MONEY TO BUY MORE.: PATIENT DECLINED

## 2020-12-20 SDOH — ECONOMIC STABILITY: FOOD INSECURITY: WITHIN THE PAST 12 MONTHS, THE FOOD YOU BOUGHT JUST DIDN'T LAST AND YOU DIDN'T HAVE MONEY TO GET MORE.: PATIENT DECLINED

## 2020-12-20 ASSESSMENT — LIFESTYLE VARIABLES
EVER HAD A DRINK FIRST THING IN THE MORNING TO STEADY YOUR NERVES TO GET RID OF A HANGOVER: NO
DOES PATIENT WANT TO STOP DRINKING: NO
TOTAL SCORE: 0
HAVE YOU EVER FELT YOU SHOULD CUT DOWN ON YOUR DRINKING: NO
ON A TYPICAL DAY WHEN YOU DRINK ALCOHOL HOW MANY DRINKS DO YOU HAVE: 0
ALCOHOL_USE: NO
EVER_SMOKED: NEVER
CONSUMPTION TOTAL: NEGATIVE
EVER FELT BAD OR GUILTY ABOUT YOUR DRINKING: NO
AVERAGE NUMBER OF DAYS PER WEEK YOU HAVE A DRINK CONTAINING ALCOHOL: 0
HAVE PEOPLE ANNOYED YOU BY CRITICIZING YOUR DRINKING: NO
HOW MANY TIMES IN THE PAST YEAR HAVE YOU HAD 5 OR MORE DRINKS IN A DAY: 0
TOTAL SCORE: 0
TOTAL SCORE: 0

## 2020-12-20 ASSESSMENT — EDINBURGH POSTNATAL DEPRESSION SCALE (EPDS)
I HAVE LOOKED FORWARD WITH ENJOYMENT TO THINGS: AS MUCH AS I EVER DID
THINGS HAVE BEEN GETTING ON TOP OF ME: NO, I HAVE BEEN COPING AS WELL AS EVER
I HAVE BLAMED MYSELF UNNECESSARILY WHEN THINGS WENT WRONG: NO, NEVER
I HAVE BEEN SO UNHAPPY THAT I HAVE HAD DIFFICULTY SLEEPING: NOT AT ALL
I HAVE BEEN ANXIOUS OR WORRIED FOR NO GOOD REASON: NO, NOT AT ALL
I HAVE BEEN ABLE TO LAUGH AND SEE THE FUNNY SIDE OF THINGS: AS MUCH AS I ALWAYS COULD
I HAVE FELT SAD OR MISERABLE: NO, NOT AT ALL
THE THOUGHT OF HARMING MYSELF HAS OCCURRED TO ME: NEVER
I HAVE FELT SCARED OR PANICKY FOR NO GOOD REASON: NO, NOT AT ALL
I HAVE BEEN SO UNHAPPY THAT I HAVE BEEN CRYING: NO, NEVER

## 2020-12-20 ASSESSMENT — PAIN DESCRIPTION - PAIN TYPE
TYPE: ACUTE PAIN

## 2020-12-20 ASSESSMENT — PATIENT HEALTH QUESTIONNAIRE - PHQ9
2. FEELING DOWN, DEPRESSED, IRRITABLE, OR HOPELESS: NOT AT ALL
SUM OF ALL RESPONSES TO PHQ9 QUESTIONS 1 AND 2: 0
1. LITTLE INTEREST OR PLEASURE IN DOING THINGS: NOT AT ALL

## 2020-12-20 NOTE — PROGRESS NOTES
0700- Report received from JIMMIE Brand RN. POC discussed. Pt sleeping. Denies needs at this time.     0815- Pt called out desired breakfast and to transfer up stairs. Pt remained firm/light. Pericare provided. Pt pivoted to wheelchair with two person assist and transferred to PPU. BS report given to Paige BECKETT.

## 2020-12-20 NOTE — L&D DELIVERY NOTE
Delivery note - [facility] 20    Patient is a 30 yoa G 2 P 0101 at 38 1/7 week gestation who presented to L&D for bleeding and uterine contractions. She was gbs negative and was admitted for desired TOLAC in active labor. She was found to be hypertensive and was started on magnesium sulfate and was given IV medication per the hypertensivwe protocol. She had amniotomy for augmentation and progressed otherwise spontaneously to completely dilated. Pushed effectively delivering a viable male infant in a MACIEL position at 0200. Baby with spontaneous cry placed on maternal abdomen for drying, suctioning and stimulation. Placenta S&I in a stack presentation with no obvious signs of abruption at time recorded. Fundus massaged to firm, pitocin rapid IV. Cytotec placed per rectum. Left labial laceration repaired with 3-0 chromic and local lidocaine. Right labial laceration repaired with 1 interrupted suture. Patient also received 100 mcg fentanyl for pain management during postpartum repair.  cc. Apgars 8&9. Weight pending. Will continue magnesium sulfate for 24 hours postpartum.     Dr. Osuna attending.

## 2020-12-20 NOTE — PROGRESS NOTES
Late Entry    :  Report received from SARAH Kang RN.    : LUIS E Hardin CNM at bedside bleeding assess. SVE, by LUIS E Hardin RN. IV started and labs sent.    : SVE, Arom , Clear. Exam by LUIS E Hardin RN.    235: Dr. Osuna at bedside to assess pt. Second IV started.    2353: Pt c/o pressure. SVE, unchanged. RN offered pt pain medication via IV or an Epidural for pain reliefe, pt declines at this time.    234: Dr. Osuna notied of sever range BP's. RN ro call CNM for orders.     235: LUIS E Hardin unavailable at this time.    2356: Orders received from Dr. Osuna.    0022: LUIS E Hardin CNM at bedside. Updated. RN Notified CNM of LD's. Per CNM Tracing reviewed.    0031: Dan placed.    0041: LUIS E Hardin CNM at bedside. Updated.    0118: Pt c/o urge to push. SVE, unchanged.    0122: Pt requesting epidural.    0133: 96.6 Temp.    0140: Dr. Gordon at bedside to place epidural.    0143: Sitting up for epidural. Pt states that she has to push.    0144: CNM notified/ at bedside.    0152:  Complete/ . Dan removed. 0155: Delivery prep. Pushed with CNM at bedside.       0200:  of viable male infant. 8/9 APGARS. 3VC.    0204:  of intact placenta. . Second degree bilateral laceration with repair per LUIS E Hardin CNM.    0426: LUIS E Hardin CNM notified that fundal massage at 0415 was moderate, fundus remained firm.    0555: LUIS E Hardin at bedside to assess pt in person. RN did fundal massage, CNM assessed bleeding, Ok per CNM.    0606: Notified LUIS E Hardin CNM of pt BP of 167/98, requesting to give Nifedipine.    0607: Call back from LUIS E Hardin CNM. Orders received.    0609: RN called CNM to ask for medication modification. New orders received.    0700: Report given to SUHAIL Guallpa RN.

## 2020-12-20 NOTE — PROGRESS NOTES
2303-Pt presents to L&D c/o UC's every 5 minutes and VB that started about 20 minutes ago. Pt also reports not feeling baby since yesterday. Immediately placed EFM and TOCO, FHT's auscultated. SVE 7/80/-2. Active, bright red bleeding noted. Denies LOF. Hx of c/s x1, desires TOLAC  2308-Phoned LUIS E Hardin CNM, updated on pt arrival/complaint/status, pt transferred to S2  2315-Report given to JOSE Brand RN. POC discussed

## 2020-12-20 NOTE — H&P
History and Physical      Lay Cooper is a 30 y.o. female  at 38w0d with an appo of 21 by LMP and consistent with 1  week ultrasound who presents for bleeding and uterine contractions. States bleeding started at approx 2245 with UC's starting prior to that. She has had a prior c/s and has undocumented surgical scar. She desires  and has been counseled during her pregnancy and also was seen at bedside by Dr. Osuna. She does want to proceed with . Pregnancy complicated by anemia and large for gestational age at last ultrasound at 81.5% of growth. Additional prenatal records are scanned in media. She had a positive quantiferon and a negative chest x-ray. She was to get treated for latent TB infection after delivery.     Subjective:   positive  For CTXS.   positive Feels pain   negative for LOF  positive for vaginal bleeding.   positive for fetal movement    ROS: Pertinent items are noted in HPI.    Past Medical History:   Diagnosis Date   • Anemia in pregnancy 2020     Past Surgical History:   Procedure Laterality Date   • PRIMARY C SECTION       OB History    Para Term  AB Living   2 1   1   1   SAB TAB Ectopic Molar Multiple Live Births             1      # Outcome Date GA Lbr Anirudh/2nd Weight Sex Delivery Anes PTL Lv   2 Current            1  03/10/12 36w0d  2.722 kg (6 lb) F CS-LTranv  Y MEMO     Social History     Socioeconomic History   • Marital status:      Spouse name: Not on file   • Number of children: Not on file   • Years of education: Not on file   • Highest education level: Not on file   Occupational History   • Not on file   Social Needs   • Financial resource strain: Not on file   • Food insecurity     Worry: Not on file     Inability: Not on file   • Transportation needs     Medical: Not on file     Non-medical: Not on file   Tobacco Use   • Smoking status: Never Smoker   • Smokeless tobacco: Never Used   Substance and Sexual Activity  "  • Alcohol use: Not Currently   • Drug use: Never   • Sexual activity: Yes     Partners: Male     Comment: None    Lifestyle   • Physical activity     Days per week: Not on file     Minutes per session: Not on file   • Stress: Not on file   Relationships   • Social connections     Talks on phone: Not on file     Gets together: Not on file     Attends Quaker service: Not on file     Active member of club or organization: Not on file     Attends meetings of clubs or organizations: Not on file     Relationship status: Not on file   • Intimate partner violence     Fear of current or ex partner: Not on file     Emotionally abused: Not on file     Physically abused: Not on file     Forced sexual activity: Not on file   Other Topics Concern   • Not on file   Social History Narrative   • Not on file     Allergies: Patient has no known allergies.  No current facility-administered medications for this encounter.     Prenatal care with [facility] starting at 27 2/7 week gestation with following problems:  Patient Active Problem List    Diagnosis Date Noted   • Anemia in pregnancy 11/23/2020   • LGA in 3rd trimester, EFW Percentile: 81.5% on 10/12/20 10/14/2020   • Encounter for supervision of high risk pregnancy in second trimester, antepartum 10/05/2020   • History of C/S, no operative report - desires TOLAC 10/05/2020           Objective:      /108   Pulse 99   Temp (!) 35.7 °C (96.3 °F) (Temporal)   Resp 18   Ht 1.575 m (5' 2\")   Wt 93 kg (205 lb)     General:   no acute distress   Skin:   normal   HEENT:  PERRLA   Lungs:   CTA bilateral   Heart:   S1, S2 normal   Abdomen:   gravid, NT   EFW:  3700   Pelvis:  adequate with gynecoid pelvis   FHT:  140 BPM   Uterine Size: S=D   Presentations: Cephalic   Cervix:     Dilation: 7 cm    Effacement: 80    Station:  -2    Consistency: Soft    Position: Anterior     Images     Show images for US-OB LIMITED GROWTH FOLLOW UP   US-OB LIMITED GROWTH FOLLOW UP  Order: " [id number]  Status:  Final result   Visible to patient:  No (inaccessible in MyChart) Next appt:  12/24/2020 at 02:00 PM in OB/Gyn (Gracia Lynch A.P.R.N.) Dx:  Encounter for supervision of high ris...  Details    Reading Physician Reading Date Result Priority   Augusto Myers M.D.  [phone] 10/12/2020 Routine      Narrative & Impression        10/12/2020 1:06 PM     HISTORY/REASON FOR EXAM:  S>D     TECHNIQUE/EXAM DESCRIPTION: OB limited ultrasound.     COMPARISON:  None     FINDINGS:  Fetal Lie:  Vertex  LMP:  3/28/2020  Clinical WARREN by LMP:  1/2/2021     Placenta (Location):  Posterior  Placenta Previa: No  Placental Grade: I     Amniotic Fluid Volume:  KIRK = 21.12 cm     Fetal Heart Rate:  132 bpm     No maternal adnexal mass is identified.     Fetal Biometry  BPD    7.51 cm, 30 weeks, 1 day, (89.3%)  HC    28.24 cm, 31 weeks, (91.3%)  AC    24.99 cm, 29 weeks, 1 day, (70.3%)  Femur Length    5.60 cm, 29 weeks, 3 days, (69.4%)  Humerus Length    5.20 cm, 30 weeks, 2 days, (92.1%)     EGA by this US:  30 weeks  WARREN by this US: 12/21/2020  WARREN by 1st US:  12/28/2020     Estimated Fetal Weight:  1408 grams  EFW Percentile: 81.5%     Comments:     IMPRESSION:     Single intrauterine pregnancy of an estimated gestational age of 30 weeks with an estimated date of delivery of 12/21/2020.     Size greater than expected for dates.     Complete fetal survey was not performed.     INTERPRETING LOCATION:  [facility], [id number]                    Lab Review  Lab:   Blood type: O+     Recent Results (from the past 5880 hour(s))   GLUCOSE 1HR GESTATIONAL    Collection Time: 11/23/20 12:59 PM   Result Value Ref Range    Glucose, Post Dose 133 70 - 139 mg/dL   CBC WITHOUT DIFFERENTIAL    Collection Time: 11/23/20  1:00 PM   Result Value Ref Range    WBC 8.0 4.8 - 10.8 K/uL    RBC 3.99 (L) 4.20 - 5.40 M/uL    Hemoglobin 8.7 (L) 12.0 - 16.0 g/dL    Hematocrit 29.2 (L) 37.0 - 47.0 %    MCV 73.2 (L) 81.4 - 97.8 fL     MCH 21.8 (L) 27.0 - 33.0 pg    MCHC 29.8 (L) 33.6 - 35.0 g/dL    RDW 42.9 35.9 - 50.0 fL    Platelet Count 277 164 - 446 K/uL    MPV 11.1 9.0 - 12.9 fL   T.PALLIDUM AB EIA    Collection Time: 20  1:00 PM   Result Value Ref Range    Syphilis, Treponemal Qual Non-Reactive Non-Reactive   GRP B STREP, BY PCR ([facility])    Collection Time: 12/10/20 11:18 AM    Specimen: Genital   Result Value Ref Range    Strep Gp B DNA PCR Negative Negative        Assessment:   Lay Cooper at 38w0d  Labor status: Active phase labor.  Obstetrical history significant for   Patient Active Problem List    Diagnosis Date Noted   • Anemia in pregnancy 2020   • LGA in 3rd trimester, EFW Percentile: 81.5% on 10/12/20 10/14/2020   • Encounter for supervision of high risk pregnancy in second trimester, antepartum 10/05/2020   • History of C/S, no operative report - desires TOLAC 10/05/2020   .      Plan:     Admit to L&D, AROM clear fluid. She is GBS negative. She does not want epidural anesthesia at this time. She had an initial elevated bp and additional labs were drawn upon admission Dr. Osuna has seen patient at bedside upon admission. We will proceed with TOLAC, however, if maternal/fetal status changes,  section may become necessary.

## 2020-12-20 NOTE — PROGRESS NOTES
Report received from SUJIT Thakur. Assessment done, Vitals stable, patient oriented to room & skylight. Call light placed within reach, patient educated in regards to safety of baby and herself. Patient instructed to call before ambulating. All questions answered.     Patient on magnesium, hourly rounding in place.

## 2020-12-20 NOTE — PROGRESS NOTES
Per chart review: patient has a history of latent TB. This was being followed by the TB center in [place]. Quat (+) with a negative chest xray. Recommendation for treatment after delivery. Patient will need to be set up with the TB center here in [place].

## 2020-12-21 LAB
ERYTHROCYTE [DISTWIDTH] IN BLOOD BY AUTOMATED COUNT: 52.6 FL (ref 35.9–50)
HCT VFR BLD AUTO: 26.4 % (ref 37–47)
HGB BLD-MCNC: 8.4 G/DL (ref 12–16)
MCH RBC QN AUTO: 23 PG (ref 27–33)
MCHC RBC AUTO-ENTMCNC: 31.8 G/DL (ref 33.6–35)
MCV RBC AUTO: 72.1 FL (ref 81.4–97.8)
PLATELET # BLD AUTO: 227 K/UL (ref 164–446)
PMV BLD AUTO: 9.5 FL (ref 9–12.9)
RBC # BLD AUTO: 3.66 M/UL (ref 4.2–5.4)
WBC # BLD AUTO: 13.4 K/UL (ref 4.8–10.8)

## 2020-12-21 PROCEDURE — A9270 NON-COVERED ITEM OR SERVICE: HCPCS | Performed by: NURSE PRACTITIONER

## 2020-12-21 PROCEDURE — 36415 COLL VENOUS BLD VENIPUNCTURE: CPT

## 2020-12-21 PROCEDURE — 770002 HCHG ROOM/CARE - OB PRIVATE (112)

## 2020-12-21 PROCEDURE — A9270 NON-COVERED ITEM OR SERVICE: HCPCS | Performed by: PHYSICIAN ASSISTANT

## 2020-12-21 PROCEDURE — 85027 COMPLETE CBC AUTOMATED: CPT

## 2020-12-21 PROCEDURE — 700102 HCHG RX REV CODE 250 W/ 637 OVERRIDE(OP): Performed by: PHYSICIAN ASSISTANT

## 2020-12-21 PROCEDURE — 700102 HCHG RX REV CODE 250 W/ 637 OVERRIDE(OP): Performed by: NURSE PRACTITIONER

## 2020-12-21 RX ORDER — FERROUS SULFATE 325(65) MG
325 TABLET ORAL
Status: DISCONTINUED | OUTPATIENT
Start: 2020-12-21 | End: 2020-12-22 | Stop reason: HOSPADM

## 2020-12-21 RX ADMIN — FERROUS SULFATE TAB 325 MG (65 MG ELEMENTAL FE) 325 MG: 325 (65 FE) TAB at 08:07

## 2020-12-21 RX ADMIN — NIFEDIPINE 30 MG: 30 TABLET, EXTENDED RELEASE ORAL at 14:29

## 2020-12-21 RX ADMIN — IBUPROFEN 800 MG: 800 TABLET, FILM COATED ORAL at 20:29

## 2020-12-21 RX ADMIN — IBUPROFEN 800 MG: 800 TABLET, FILM COATED ORAL at 08:07

## 2020-12-21 ASSESSMENT — PAIN DESCRIPTION - PAIN TYPE
TYPE: ACUTE PAIN

## 2020-12-21 NOTE — PROGRESS NOTES
Assessment completed. WDL. Patient progressing according to plan of care.  Pt states she will call when she needs prn pain medication. Pt denies any other issues at this time. Educated pt about feeding infant every 2-3 hours or when infant is showing cues. Pt encouraged to call for next feed to assess latch. Pt encouraged to call for any needs.

## 2020-12-21 NOTE — PROGRESS NOTES
Assessment complete. Discussed POC and answered all questions.     1230- patient's SO states she needs to discharge today because their older child wants them to come home.    1320- Notified Dr. Bundy that patient would like to discharge. Requested that he come see patient.

## 2020-12-21 NOTE — PROGRESS NOTES
1215 -1st unit of blood started   1430- 1st unit of blood completed, patient tolerated very well, no side-effects noted! Patient states of feeling a lot better, no dizziness!      Resident - Mikki Vasquez notified.      1545- 2nd unit of blood started

## 2020-12-21 NOTE — CONSULTS
Met with MOB for an initial lactation visit.  MOB delivered her second baby yesterday, 12/20/20, at 0200 at 38.2 weeks gestation.  Risk factor for breastfeeding is: increased BMI of 37.49.  MOB stated she breast fed her first baby for 6 months without any concern.  Lactation assistance was offered, but MOB declined.  MOB denied pain and tissue damage to her breasts with latch.    MOB reminded to offer infant the breast per feeding cues and within 3-4 hours from the last feed for a minimum of 8 or more feeds in a 24 hour period.    MOB has Access to Healthcare.  She stated she does not have WIC and is not aware if she qualifies for the program.  MOB stated she is interested in applying for the program.  MOB provided with a list of the local WIC offices in her area.    MOB verbalized understanding of all information provided to her and denied having any lactation questions and/or concerns at this time.  Encouraged MOB to call for lactation assistance as needed.

## 2020-12-21 NOTE — CARE PLAN
Problem: Altered physiologic condition related to immediate post-delivery state and potential for bleeding/hemorrhage  Goal: Patient physiologically stable as evidenced by normal lochia, palpable uterine involution and vital signs within normal limits  Outcome: PROGRESSING AS EXPECTED  Note: Fundus firm. Lochia light.      Problem: Potential for postpartum infection related to presence of episiotomy/vaginal tear and/or uterine contamination  Goal: Patient will be absent from signs and symptoms of infection  Outcome: PROGRESSING AS EXPECTED  Note: Patient is afebrile. No signs and symptoms of infection noted.

## 2020-12-21 NOTE — PROGRESS NOTES
2310-  Called Midwife Mayco Witt to report pt's H/H results. She is unable to speak with me at this time  because she was currently in a delivery.   2340- Called Midwife Mayco Witt to report pt's H/H, pt's current vitals including elevated HR. Also reported that pt is feeling very uncomfortable with estevez catheter and that pt is  requesting to have it taken out. Eduar stated that estevez catheter may be discontinued at this time and to monitor I/O for any major changes, doesn't have to be strict.

## 2020-12-21 NOTE — PROGRESS NOTES
Post Partum Progress Note    Name:   Lay Cooper   Date/Time:  12/21/2020 - 7:03 AM  Chief Admitting Dx:  Pregnancy  Delivery Type:  vaginal, spontaneous  Post-Op/Post Partum Days #:  1    Subjective:  Abdominal pain: no  Ambulating:   yes  Tolerating liquids:  yes  Tolerating food:  yes common adult  Flatus:   yes  BM:    no  Bleeding:   with a small amount of bleeding  Voiding:   yes  Dizziness:   no  Feeding:   breast    Vitals:    12/20/20 2200 12/20/20 2330 12/21/20 0230 12/21/20 0600   BP: 141/89 144/97 122/74 125/70   Pulse: (!) 114 (!) 113 (!) 102 (!) 112   Resp: 18 18 18 18   Temp: 37.2 °C (99 °F)  37 °C (98.6 °F) 36.4 °C (97.6 °F)   TempSrc: Temporal  Temporal Temporal   SpO2: 96% 97% 95% 95%   Weight:       Height:           Exam:  Breast: Tenderness no and Engorged no  Abdomen: Abdomen soft, non-tender. BS normal. No masses,  No organomegaly  Fundal Tenderness:  no  Fundus Firm: yes  Incision: none  Below umbilicus: yes  Perineum: perineum intact  Lochia: mild  Extremities: trace extremities, peripheral pulses and reflexes normal    Meds:  Current Facility-Administered Medications   Medication Dose   • ondansetron (ZOFRAN ODT) dispertab 4 mg  4 mg    Or   • ondansetron (ZOFRAN) syringe/vial injection 4 mg  4 mg   • oxytocin (PITOCIN) infusion (for postpartum)   mL/hr   • oxyCODONE-acetaminophen (PERCOCET) 5-325 MG per tablet 2 Tab  2 Tab   • LR infusion     • tetanus-dipth-acell pertussis (Tdap) inj 0.5 mL  0.5 mL   • measles, mumps and rubella vaccine (MMR) injection 0.5 mL  0.5 mL   • PRN oxytocin (PITOCIN) (20 Units/1000 mL) PRN for excessive uterine bleeding - See Admin Instr  125-999 mL/hr   • miSOPROStol (CYTOTEC) tablet 600 mcg  600 mcg   • docusate sodium (COLACE) capsule 100 mg  100 mg   • bisacodyl (DULCOLAX) suppository 10 mg  10 mg   • ibuprofen (MOTRIN) tablet 800 mg  800 mg   • oxyCODONE-acetaminophen (PERCOCET) 5-325 MG per tablet 1 Tab  1 Tab   • NIFEdipine SR (PROCARDIA-XL)  tablet 30 mg  30 mg   • magnesium sulfate 40 g/1000mL infusion  2 g/hr       Labs:   Recent Labs     12/19/20  2320 12/20/20  0900 12/20/20  1947   WBC 8.5 13.2*  --    RBC 4.12* 3.12*  --    HEMOGLOBIN 8.5* 6.3* 8.5*   HEMATOCRIT 28.5* 21.5* 27.0*   MCV 69.2* 68.9*  --    MCH 20.6* 19.9*  --    MCHC 29.8* 28.8*  --    RDW 43.9 43.4  --    PLATELETCT 279 256  --    MPV 10.3 10.3  --        Assessment:  Chief Admitting Dx:  Pregnancy  Delivery Type:  vaginal, spontaneous  Tubal Ligation:  no    Plan:  Continue routine post partum care. Mag stopped at 2am, BP stable. S/p 2U PRBCs - doing well. Continue to monitor BPs closely. Anticipate D/C home tomorrow. Start PO FeSO4.    MICHELLE Junior.

## 2020-12-22 ENCOUNTER — PHARMACY VISIT (OUTPATIENT)
Dept: PHARMACY | Facility: MEDICAL CENTER | Age: 30
End: 2020-12-22
Payer: COMMERCIAL

## 2020-12-22 VITALS
BODY MASS INDEX: 37.73 KG/M2 | HEIGHT: 62 IN | HEART RATE: 106 BPM | SYSTOLIC BLOOD PRESSURE: 148 MMHG | DIASTOLIC BLOOD PRESSURE: 104 MMHG | RESPIRATION RATE: 18 BRPM | TEMPERATURE: 98.7 F | OXYGEN SATURATION: 96 % | WEIGHT: 205 LBS

## 2020-12-22 PROCEDURE — A9270 NON-COVERED ITEM OR SERVICE: HCPCS | Performed by: PHYSICIAN ASSISTANT

## 2020-12-22 PROCEDURE — 700102 HCHG RX REV CODE 250 W/ 637 OVERRIDE(OP): Performed by: PHYSICIAN ASSISTANT

## 2020-12-22 PROCEDURE — RXMED WILLOW AMBULATORY MEDICATION CHARGE: Performed by: NURSE PRACTITIONER

## 2020-12-22 PROCEDURE — 700102 HCHG RX REV CODE 250 W/ 637 OVERRIDE(OP): Performed by: NURSE PRACTITIONER

## 2020-12-22 PROCEDURE — A9270 NON-COVERED ITEM OR SERVICE: HCPCS | Performed by: NURSE PRACTITIONER

## 2020-12-22 RX ORDER — NIFEDIPINE 30 MG
30 TABLET, EXTENDED RELEASE ORAL DAILY
Qty: 30 TAB | Refills: 1 | Status: SHIPPED | OUTPATIENT
Start: 2020-12-22 | End: 2021-09-01

## 2020-12-22 RX ORDER — PSEUDOEPHEDRINE HCL 30 MG
100 TABLET ORAL 2 TIMES DAILY PRN
Qty: 60 CAP | Refills: 0 | Status: SHIPPED | OUTPATIENT
Start: 2020-12-22 | End: 2021-09-01

## 2020-12-22 RX ORDER — FERROUS SULFATE 325(65) MG
325 TABLET ORAL
Qty: 30 TAB | Refills: 3 | Status: SHIPPED | OUTPATIENT
Start: 2020-12-22 | End: 2021-09-01

## 2020-12-22 RX ORDER — IBUPROFEN 800 MG/1
800 TABLET ORAL EVERY 6 HOURS PRN
Qty: 30 TAB | Refills: 0 | Status: SHIPPED | OUTPATIENT
Start: 2020-12-22 | End: 2021-09-01

## 2020-12-22 RX ADMIN — NIFEDIPINE 30 MG: 30 TABLET, EXTENDED RELEASE ORAL at 16:14

## 2020-12-22 RX ADMIN — IBUPROFEN 800 MG: 800 TABLET, FILM COATED ORAL at 06:03

## 2020-12-22 RX ADMIN — IBUPROFEN 800 MG: 800 TABLET, FILM COATED ORAL at 16:14

## 2020-12-22 RX ADMIN — FERROUS SULFATE TAB 325 MG (65 MG ELEMENTAL FE) 325 MG: 325 (65 FE) TAB at 07:32

## 2020-12-22 NOTE — PROGRESS NOTES
Took report from Rita BECKETT. Assumed patient care. Patient assessed VS stable. Pain reported as 3/10 on pain scale. Breasts soft. Fundus firm 1 below U, lochia light rubra. Legs show no signs of swelling, heat, redness, pain. All questions and concerns answered at this time. Bed rails up x 2. Call light in reach. Patient belongings in reach. Will continue to monitor.

## 2020-12-22 NOTE — CARE PLAN
Problem: Communication  Goal: The ability to communicate needs accurately and effectively will improve  Outcome: MET     Problem: Safety  Goal: Will remain free from injury  Outcome: MET  Goal: Will remain free from falls  Outcome: MET     Problem: Infection  Goal: Will remain free from infection  Outcome: MET     Problem: Venous Thromboembolism (VTW)/Deep Vein Thrombosis (DVT) Prevention:  Goal: Patient will participate in Venous Thrombosis (VTE)/Deep Vein Thrombosis (DVT)Prevention Measures  Outcome: MET     Problem: Bowel/Gastric:  Goal: Normal bowel function is maintained or improved  Outcome: MET  Goal: Will not experience complications related to bowel motility  Outcome: MET     Problem: Knowledge Deficit  Goal: Knowledge of disease process/condition, treatment plan, diagnostic tests, and medications will improve  Outcome: MET  Goal: Knowledge of the prescribed therapeutic regimen will improve  Outcome: MET     Problem: Discharge Barriers/Planning  Goal: Patient's continuum of care needs will be met  Outcome: MET     Problem: Fluid Volume:  Goal: Will maintain balanced intake and output  Outcome: MET     Problem: Skin Integrity  Goal: Risk for impaired skin integrity will decrease  Outcome: MET     Problem: Pain Management  Goal: Pain level will decrease to patient's comfort goal  Outcome: MET     Problem: Urinary Elimination:  Goal: Ability to reestablish a normal urinary elimination pattern will improve  Outcome: MET     Problem: Altered physiologic condition related to immediate post-delivery state and potential for bleeding/hemorrhage  Goal: Patient physiologically stable as evidenced by normal lochia, palpable uterine involution and vital signs within normal limits  Outcome: MET     Problem: Potential for postpartum infection related to presence of episiotomy/vaginal tear and/or uterine contamination  Goal: Patient will be absent from signs and symptoms of infection  Outcome: MET     Problem: Alteration  in comfort related to episiotomy, vaginal repair and/or after birth pains  Goal: Patient is able to ambulate, care for self and infant  Outcome: MET  Goal: Patient verbalizes acceptable pain level  Outcome: MET     Problem: Potential knowledge deficit related to lack of understanding of self and  care  Goal: Patient will verbalize understanding of self and infant care  Outcome: MET  Goal: Patient will demonstrate ability to care for self and infant  Outcome: MET     Problem: Potential anxiety related to difficulty adapting to parental role  Goal: Patient will verbalize and demonstrate effective bonding and parenting behavior  Outcome: MET

## 2020-12-22 NOTE — CARE PLAN
Problem: Altered physiologic condition related to immediate post-delivery state and potential for bleeding/hemorrhage  Goal: Patient physiologically stable as evidenced by normal lochia, palpable uterine involution and vital signs within normal limits  Outcome: PROGRESSING AS EXPECTED  Note: Patient VS stable and within defined limits. Fundus firm 1 below U, lochia light rubra at time of assessment.      Problem: Alteration in comfort related to episiotomy, vaginal repair and/or after birth pains  Goal: Patient is able to ambulate, care for self and infant  Outcome: PROGRESSING AS EXPECTED  Note: Patient is able to ambulate around the room. Patient is able to care for infant by feeding, diapering, and swaddling.

## 2020-12-22 NOTE — DISCHARGE INSTRUCTIONS
POSTPARTUM DISCHARGE INSTRUCTIONS FOR MOM    YOB: 1990   Age: 30 y.o.               Admit Date: 12/19/2020     Discharge Date: 12/22/2020  Attending Doctor:  Terra Osuna, *                  Allergies:  Patient has no known allergies.    Discharged to home by car. Discharged via wheelchair, hospital escort: Yes.  Special equipment needed: Not Applicable  Belongings with: Personal  Be sure to schedule a follow-up appointment with your primary care doctor or any specialists as instructed.     Discharge Plan:   Diet Plan: Discussed  Activity Level: Discussed  Confirmed Follow up Appointment: Patient to Call and Schedule Appointment  Confirmed Symptoms Management: Discussed  Medication Reconciliation Updated: Yes  Influenza Vaccine Indication: Not indicated: Previously immunized this influenza season and > 8 years of age    REASONS TO CALL YOUR OBSTETRICIAN:  1.   Persistent fever or shaking chills (Temperature higher than 100.4)  2.   Heavy bleeding (soaking more than 1 pad per hour); Passing clots  3.   Foul odor from vagina  4.   Mastitis (Breast infection; breast pain, chills, fever, redness)  5.   Urinary pain, burning or frequency  6.   Episiotomy infection  7.   Abdominal incision infection  8.   Severe depression longer than 24 hours    HAND WASHING  · Prior to handling the baby.  · Before breastfeeding or bottle feeding baby.  · After using the bathroom or changing the baby's diaper.    VAGINAL CARE  · Nothing inside vagina for 6 weeks: no sexual intercourse, tampons or douching.  · Bleeding may continue for 2-4 weeks.  Amount may vary.    · Call your physician for heavy bleeding which means soaking more than 1 pad per hour    BIRTH CONTROL  · It is possible to become pregnant at any time after delivery and while breastfeeding.  · Plan to discuss a method of birth control with your physician at your follow up visit. visit.    DIET AND ELIMINATION  · Eating more fiber (bran cereal, fruits,  "and vegetables) and drinking plenty of fluids will help to avoid constipation.  · Urinary frequency after childbirth is normal.    POSTPARTUM BLUES  During the first few days after birth, you may experience a sense of the \"blues\" which may include impatience, irritability or even crying.  These feeling come and go quickly.  However, as many as 1 in 10 women experience emotional symptoms known as postpartum depression.    Postpartum depression:  May start as early as the second or third day after delivery or take several weeks or months to develop.  Symptoms of \"blues\" are present, but are more intense:  Crying spells; loss of appetite; feelings of hopelessness or loss of control; fear of touching the baby; over concern or no concern at all about the baby; little or no concern about your own appearance/caring for yourself; and/or inability to sleep or excessive sleeping.  Contact your physician if you are experiencing any of these symptoms.    Crisis Hotline:  · [place] Crisis Hotline:  8-668-YQUHPOX  Or 1-386.141.3975  · [place] Crisis Hotline:  1-506.224.5862  Or 059-290-9739    PREVENTING SHAKEN BABY:  If you are angry or stressed, PUT THE BABY IN THE CRIB, step away, take some deep breaths, and wait until you are calm to care for the baby.  DO NOT SHAKE THE BABY.  You are not alone, call a supporter for help.    · Crisis Call Center 24/7 crisis line 862-502-3527 or 1-731.301.6183  · You can also text them, text \"ANSWER\" to 168763    QUIT SMOKING/TOBACCO USE:  I understand the use of any tobacco products increases my chance of suffering from future heart disease and could cause other illnesses which may shorten my life. Quitting the use of tobacco products is the single most important thing I can do to improve my health. For further information on smoking / tobacco cessation call a Toll Free Quit Line at 1-181.162.8469 (*[employer] [place]) or 1-587.722.8621 (American Lung Association) or you can access the " web based program at www.lungusa.org.    · [place] Tobacco Users Help Line:  (757) 836-7720       Toll Free: 1-405.666.5421  · Quit Tobacco Program [facility] (747)075-2388    DEPRESSION / SUICIDE RISK:  As you are discharged from this [facility], it is important to learn how to keep safe from harming yourself.    Recognize the warning signs:  · Abrupt changes in personality, positive or negative- including increase in energy   · Giving away possessions  · Change in eating patterns- significant weight changes-  positive or negative  · Change in sleeping patterns- unable to sleep or sleeping all the time   · Unwillingness or inability to communicate  · Depression  · Unusual sadness, discouragement and loneliness  · Talk of wanting to die  · Neglect of personal appearance   · Rebelliousness- reckless behavior  · Withdrawal from people/activities they love  · Confusion- inability to concentrate     If you or a loved one observes any of these behaviors or has concerns about self-harm, here's what you can do:  · Talk about it- your feelings and reasons for harming yourself  · Remove any means that you might use to hurt yourself (examples: pills, rope, extension cords, firearm)  · Get professional help from the community (Mental Health, Substance Abuse, psychological counseling)  · Do not be alone:Call your Safe Contact- someone whom you trust who will be there for you.  · Call your local CRISIS HOTLINE 242-2911 or 878-958-9437  · Call your local Children's Mobile Crisis Response Team [place] (167) 436-1644 or www.[employer]  · Call the toll free National Suicide Prevention Hotlines   · National Suicide Prevention Lifeline 835-663-HXEW (5736)  · National Hope Line Network 800-SUICIDE (146-1911)    DISCHARGE SURVEY:  Thank you for choosing [facility].  We hope we provided you with very good care.  You may be receiving a survey in the mail.  Please fill it out.  Your opinion  is valuable to us.    ADDITIONAL EDUCATIONAL MATERIALS GIVEN TO PATIENT:        My signature on this form indicates that:  1.  I have reviewed and understand the above information  2.  My questions regarding this information have been answered to my satisfaction.  3.  I have formulated a plan with my discharge nurse to obtain my prescribed medication for home.

## 2020-12-22 NOTE — DISCHARGE PLANNING
Meds-to-Beds: Discharge prescription orders listed below delivered to patient's bedside. RN notified. Patient counseled. Patient elected to have co-payment billed to patient account.        Lay Cooper   Home Medication Instructions HÉCTOR:[id number]    Printed on:12/22/20 1036   Medication Information                      docusate sodium 100 MG Cap  Take 1 capsule by mouth 2 times a day as needed for Constipation.             ferrous sulfate 325 (65 Fe) MG tablet  Take 1 Tablet by mouth every morning with breakfast.             ibuprofen (MOTRIN) 800 MG Tab  Take 1 Tablet by mouth every 6 hours as needed.             NIFEdipine (ADALAT CC) 30 MG CR tablet  Take 1 Tablet by mouth every day.               Erica Griffin, PharmD

## 2020-12-22 NOTE — PROGRESS NOTES
Discharge education and follow up information for infant and patient discussed with patient.Reinforced importance of  screen. Patient verbalizes understanding.

## 2020-12-22 NOTE — LACTATION NOTE
Met with MOB for a lactation follow up visit.  MOB stated she continues to breastfeed without concern and denied pain and tissue damage to her breasts with latch.  Infant observed breastfeeding for approximately 1  minute at MOB's right breast in the side lying position.  Infant observed to have bottom lip curled under and he was feeding with a non-nutritive suck.  Encouraged MOB to gently rub infant at shoulders to encourage infant to suckle in a nutritive manner and to make sure his bottom lip remains uncurled during latch.  Explained the importance a proper latch and nutritive suck is to infant receiving an adequate amount of breast milk.  Also, encouraged MOB to undress infant down to the diaper to help keep infant more alert during feeds.  Latch assistance was offered, but MOB declined.  Infant's weight loss to date and voiding/stooling pattern remains within defined limits.    Again, reviewed breastfeeding plan with MOB.  MOB was encouraged to offer infant the breast per feeding cues and within 3-4 hours from the last feed for a minimum of 8 or more feeds in a 24 hour period.    Provided MOB with written information on the lactation support available to her through the [facility] via Zoom.    MOB verbalized understanding of all information provided to her and denied having any further lactation questions and/or concerns at this time.  Encouraged MOB to call for lactation assistance as needed prior to discharge.

## 2020-12-22 NOTE — DISCHARGE PLANNING
:    Consult: Pt has hx of latent TB infection and needs follow up for management/treatment. No PCP, may be follow up at [facility]?    LSW spoke with LUCHO's bedside RN who reported LUCHO acknowledges she needs to schedule an appointment with a new PCP.  LUCHO went on to report she was going to look into options with [facility] who accepts her insurance.

## 2020-12-22 NOTE — DISCHARGE SUMMARY
Discharge Summary:      Lay Cooper    Admit Date:   2020  Discharge Date:  2020     Admitting diagnosis:  Pregnancy  Discharge Diagnosis: Status post vaginal, spontaneous.   Pregnancy Complications: Hx of C/S, latent TB infection  Tubal Ligation:  no        History:  Past Medical History:   Diagnosis Date   • Anemia in pregnancy 2020     OB History    Para Term  AB Living   2 1   1   1   SAB TAB Ectopic Molar Multiple Live Births             1      # Outcome Date GA Lbr Anirudh/2nd Weight Sex Delivery Anes PTL Lv   2 Current            1  03/10/12 36w0d  2.722 kg (6 lb) F CS-LTranv  Y MEMO        Patient has no known allergies.  Patient Active Problem List    Diagnosis Date Noted   • Anemia in pregnancy 2020   • LGA in 3rd trimester, EFW Percentile: 81.5% on 10/12/20 10/14/2020   • Encounter for supervision of high risk pregnancy in second trimester, antepartum 10/05/2020   • History of C/S, no operative report - desires TOLAC 10/05/2020        Hospital Course:   30 y.o. , now para 1, was admitted with the above mentioned diagnosis, underwent Active Labor, vaginal, spontaneous. Patient postpartum course was unremarkable, with progressive advancement in diet , ambulation and toleration of oral analgesia. Patient without complaints today and desires discharge.      Vitals:    20 1400 20 1800 20 2200 20 0200   BP: 132/91 130/86 135/89 138/92   Pulse: 97 98 100 (!) 102   Resp: 18 16 17 18   Temp: 36.7 °C (98 °F) 37.2 °C (99 °F) 37.3 °C (99.1 °F) 36.8 °C (98.2 °F)   TempSrc: Temporal Temporal Oral Temporal   SpO2: 95% 95% 97% 95%   Weight:       Height:           Current Facility-Administered Medications   Medication Dose   • ferrous sulfate tablet 325 mg  325 mg   • ondansetron (ZOFRAN ODT) dispertab 4 mg  4 mg    Or   • ondansetron (ZOFRAN) syringe/vial injection 4 mg  4 mg   • oxytocin (PITOCIN) infusion (for postpartum)   mL/hr    • oxyCODONE-acetaminophen (PERCOCET) 5-325 MG per tablet 2 Tab  2 Tab   • LR infusion     • tetanus-dipth-acell pertussis (Tdap) inj 0.5 mL  0.5 mL   • measles, mumps and rubella vaccine (MMR) injection 0.5 mL  0.5 mL   • PRN oxytocin (PITOCIN) (20 Units/1000 mL) PRN for excessive uterine bleeding - See Admin Instr  125-999 mL/hr   • miSOPROStol (CYTOTEC) tablet 600 mcg  600 mcg   • docusate sodium (COLACE) capsule 100 mg  100 mg   • bisacodyl (DULCOLAX) suppository 10 mg  10 mg   • ibuprofen (MOTRIN) tablet 800 mg  800 mg   • oxyCODONE-acetaminophen (PERCOCET) 5-325 MG per tablet 1 Tab  1 Tab   • NIFEdipine SR (PROCARDIA-XL) tablet 30 mg  30 mg       Exam:  Breast Exam: negative  Abdomen: Abdomen soft, non-tender. BS normal. No masses,  No organomegaly  Fundus Non Tender: yes  Incision: none  Perineum: perineum intact  Extremity: extremities, peripheral pulses and reflexes normal     Labs:  Recent Labs     12/19/20  2320 12/20/20  0900 12/20/20  1947 12/21/20  0759   WBC 8.5 13.2*  --  13.4*   RBC 4.12* 3.12*  --  3.66*   HEMOGLOBIN 8.5* 6.3* 8.5* 8.4*   HEMATOCRIT 28.5* 21.5* 27.0* 26.4*   MCV 69.2* 68.9*  --  72.1*   MCH 20.6* 19.9*  --  23.0*   MCHC 29.8* 28.8*  --  31.8*   RDW 43.9 43.4  --  52.6*   PLATELETCT 279 256  --  227   MPV 10.3 10.3  --  9.5        Activity:   Discharge to home  Pelvic Rest x 6 weeks  Need follow up at [facility] for latent TB infection-  Need BP check in 1 week    Assessment:  normal postpartum course  Discharge Assessment: Taking in adequate diet and fluids, no heavy bleeding or foul smelling discharge, no redness or severe pain of breasts, voiding without difficulty     [facility] ; 1 week for BP check.     Pt need to call or go to ED for any of the following:  Fever over 100.5  Severe abd pain  Severe pain or swelling of laceration or incinsion site  Red streaks or painful masses in the breasts  Foul smelling d/c or  lochia  Heavy vaginal bleeding saturating a pad per hour  Sxs of PP depression    Discharge Meds:   Current Outpatient Medications   Medication Sig Dispense Refill   • docusate sodium 100 MG Cap Take 100 mg by mouth 2 times a day as needed for Constipation. 60 Cap 0   • ferrous sulfate 325 (65 Fe) MG tablet Take 1 Tab by mouth every morning with breakfast. 30 Tab 3   • ibuprofen (MOTRIN) 800 MG Tab Take 1 Tab by mouth every 6 hours as needed. 30 Tab 0   • NIFEdipine SR (ADALAT CC) 30 MG CR tablet Take 1 Tab by mouth every day. 30 Tab 1       KIET Santiago.

## 2020-12-31 ENCOUNTER — NON-PROVIDER VISIT (OUTPATIENT)
Dept: OBGYN | Facility: CLINIC | Age: 30
End: 2020-12-31
Payer: COMMERCIAL

## 2020-12-31 VITALS — SYSTOLIC BLOOD PRESSURE: 126 MMHG | DIASTOLIC BLOOD PRESSURE: 80 MMHG

## 2020-12-31 NOTE — NON-PROVIDER
Patient here for BP check= 126/80  She had vaginal delivery on 12/19/2020  She is on Nifedipine 30 mg daily  Phone number: [phone]  Pharmacy verified  Consulted with Mayco DOMINGUEZ, she advised for patient to continue taking her BP medication as prescribed and follow up for her PP visit. Patient was informed and agrees.

## 2021-01-25 PROBLEM — Z98.891 HISTORY OF VBAC: Status: ACTIVE | Noted: 2021-01-25

## 2021-01-25 PROBLEM — Z22.7 LATENT TUBERCULOSIS: Status: ACTIVE | Noted: 2021-01-25

## 2021-01-25 PROBLEM — Z87.59 HISTORY OF PRE-ECLAMPSIA: Status: ACTIVE | Noted: 2021-01-25

## 2021-08-24 ENCOUNTER — APPOINTMENT (OUTPATIENT)
Dept: OBGYN | Facility: CLINIC | Age: 31
End: 2021-08-24
Payer: COMMERCIAL

## 2021-09-01 ENCOUNTER — HOSPITAL ENCOUNTER (OUTPATIENT)
Facility: MEDICAL CENTER | Age: 31
End: 2021-09-01
Attending: NURSE PRACTITIONER
Payer: COMMERCIAL

## 2021-09-01 ENCOUNTER — INITIAL PRENATAL (OUTPATIENT)
Dept: OBGYN | Facility: CLINIC | Age: 31
End: 2021-09-01
Payer: COMMERCIAL

## 2021-09-01 VITALS — WEIGHT: 191 LBS | SYSTOLIC BLOOD PRESSURE: 112 MMHG | BODY MASS INDEX: 34.93 KG/M2 | DIASTOLIC BLOOD PRESSURE: 60 MMHG

## 2021-09-01 DIAGNOSIS — Z92.89 HISTORY OF BLOOD TRANSFUSION: ICD-10-CM

## 2021-09-01 DIAGNOSIS — O09.899 SHORT INTERVAL BETWEEN PREGNANCIES AFFECTING PREGNANCY, ANTEPARTUM: ICD-10-CM

## 2021-09-01 DIAGNOSIS — Z98.891 HISTORY OF VBAC: ICD-10-CM

## 2021-09-01 DIAGNOSIS — Z86.11 HISTORY OF TUBERCULOSIS: ICD-10-CM

## 2021-09-01 PROCEDURE — 87660 TRICHOMONAS VAGIN DIR PROBE: CPT

## 2021-09-01 PROCEDURE — 87480 CANDIDA DNA DIR PROBE: CPT

## 2021-09-01 PROCEDURE — 90715 TDAP VACCINE 7 YRS/> IM: CPT | Performed by: NURSE PRACTITIONER

## 2021-09-01 PROCEDURE — 0500F INITIAL PRENATAL CARE VISIT: CPT | Performed by: NURSE PRACTITIONER

## 2021-09-01 PROCEDURE — 87591 N.GONORRHOEAE DNA AMP PROB: CPT

## 2021-09-01 PROCEDURE — 87510 GARDNER VAG DNA DIR PROBE: CPT

## 2021-09-01 PROCEDURE — 90471 IMMUNIZATION ADMIN: CPT | Performed by: NURSE PRACTITIONER

## 2021-09-01 PROCEDURE — 87491 CHLMYD TRACH DNA AMP PROBE: CPT

## 2021-09-01 ASSESSMENT — FIBROSIS 4 INDEX: FIB4 SCORE: 0.59

## 2021-09-01 NOTE — PROGRESS NOTES
Subjective:   Lay Cooper is a 31 y.o.  who presents for her new OB exam.  She is 29w6d with an WARREN of Estimated Date of Delivery: 21 by LMP she was tracking and sure of c/w 15 wk US. She is feeling well and but has had vaginal itching and burning for one week. Denies VB, LOF, contractions or pain. No ER visits or previous care in this pregnancy. Denies dysuria, vaginal DC, fever. Reports good fetal movement. Too late AFP.  Declines CF.  Unsure NIPT testing.     Past Medical History:   Diagnosis Date   • Blood transfusion without reported diagnosis    • Hypertension    • Latent tuberculosis 2021   Treated for 9 months in .     Psych Hx: Patient denies any history of depression, anxiety, PTSD, bipolar or any other psychological issues.     Past Surgical History:   Procedure Laterality Date   • PRIMARY C SECTION          OB History    Para Term  AB Living   3 2 1 1   2   SAB TAB Ectopic Molar Multiple Live Births             2      # Outcome Date GA Lbr Anirudh/2nd Weight Sex Delivery Anes PTL Lv   3 Current            2 Term 20 38w1d  3.53 kg (7 lb 12.5 oz) M  None N MEMO   1  03/10/12 36w0d  2.722 kg (6 lb) F CS-LTranv  Y MEMO        Gynecological Hx: Denies any hx of STIs, including HSV. Denies any vulvovaginal disorders and no hx of abnormal cervical cytology. Last pap  WNL in [place].     Sexual Hx: One current male partner, who is FOB, same from other pregnancies    Contraceptive Hx: Has not used in the past and has since discontinued use.     Family History   Problem Relation Age of Onset   • No Known Problems Mother    • No Known Problems Father      Denies any genetic disorders in family history.     Social History     Socioeconomic History   • Marital status:      Spouse name: Not on file   • Number of children: Not on file   • Years of education: Not on file   • Highest education level: Not on file   Occupational History   • Not on file   Tobacco  Use   • Smoking status: Never Smoker   • Smokeless tobacco: Never Used   Vaping Use   • Vaping Use: Never used   Substance and Sexual Activity   • Alcohol use: Not Currently   • Drug use: Never   • Sexual activity: Yes     Partners: Male     Comment: None    Other Topics Concern   • Not on file   Social History Narrative   • Not on file     Social Determinants of Health     Financial Resource Strain:    • Difficulty of Paying Living Expenses:    Food Insecurity: Unknown   • Worried About Running Out of Food in the Last Year: Patient refused   • Ran Out of Food in the Last Year: Patient refused   Transportation Needs: Unknown   • Lack of Transportation (Medical): Patient refused   • Lack of Transportation (Non-Medical): Patient refused   Physical Activity:    • Days of Exercise per Week:    • Minutes of Exercise per Session:    Stress:    • Feeling of Stress :    Social Connections:    • Frequency of Communication with Friends and Family:    • Frequency of Social Gatherings with Friends and Family:    • Attends Sabianism Services:    • Active Member of Clubs or Organizations:    • Attends Club or Organization Meetings:    • Marital Status:    Intimate Partner Violence:    • Fear of Current or Ex-Partner:    • Emotionally Abused:    • Physically Abused:    • Sexually Abused:        FOMAGDALENA is invovled and lives with Lay Cooper.  Pregnancy is unplanned but desired.    She is currently not working, denies any heavy lifting or exposure to potential teratogens like environmental or occupational toxins.   Denies alcohol use, drug use, or tobacco use in pregnancy.   Denies any current or hx of sexual, emotional or physical abuse or trauma.     Current Medications: PNV   Allergies: Denies allergies to medications, food, or environmental allergies    Objective:      Vitals:    09/01/21 1317   BP: 112/60   Weight: 86.6 kg (191 lb)        See Prenatal Physical and Prenatal Vitals  UA WNL today    Assessment:      1.  IUP @  29w6d per LMP      2.  S=D      3.  See problem list as follows       Patient Active Problem List    Diagnosis Date Noted   • Short interval between pregnancies affecting pregnancy 2021   • History of  2021   • Latent tuberculosis 2021   • History of pre-eclampsia 2021         Plan:   - GC/CT and vag path self collected, pap records requested  - Third trimester labs ordered  - Tdap today and BTL signed, FKC sheet given   - Will ask about NIPT testing next appt   - Baby aspirin daily   - Prenatal labs ordered - lab slip given  - Discussed PNV, nutrition, adequate water intake, and exercise/weight gain in pregnancy  - NOB informational packet with anticipatory guidance given  - Information on Centering Pregnancy given, too late  - S/sx of pregnancy warning signs and PTL precautions given  - Complete OB US in asap wks  - Return to [facility] in 2 wks

## 2021-09-01 NOTE — LETTER
"Count Your Baby's Movements  Another step to a healthy delivery    Dominik Cooper              Dept: [phone]    How Many Weeks Pregnant? Unknown    Date to Begin Countin21              How to use this chart    One way for your physician to keep track of your baby's health is by knowing how often the baby moves (or \"kicks\") in your womb.  You can help your physician to do this by using this chart every day.    Every day, you should see how many hours it takes for your baby to move 10 times.  Start in the morning, as soon as you get up.    · First, write down the time your baby moves until you get to 10.  · Check off one box every time your baby moves until you get to 10.  · Write down the time you finished counting in the last column.  · Total how long it took to count up all 10 movements.  · Finally, fill in the box that shows how long this took.  After counting 10 movements, you no longer have to count any more that day.  The next morning, just start counting again as soon as you get up.    What should you call a \"movement\"?  It is hard to say, because it will feel different from one mother to another and from one pregnancy to the next.  The important thing is that you count the movements the same way throughout your pregnancy.  If you have more questions, you should ask your physician.    Count carefully every day!  SAMPLE:  Week 28    How many hours did it take to feel 10 movements?       Start  Time     1     2     3     4     5     6     7     8     9     10   Finish Time   Mon 8:20 ·  ·  ·  ·  ·  ·  ·  ·  ·  ·  11:40                  Sat               Sun                 IMPORTANT: You should contact your physician if it takes more than two hours for you to feel 10 movements.  Each morning, write down the time and start to count the movements of your baby.  Keep track by checking off one box every time you feel one movement.  When you have felt 10 " "\"kicks\", write down the time you finished counting in the last column.  Then fill in the   box (over the check michael) for the number of hours it took.  Be sure to read the complete instructions on the previous page.            "

## 2021-09-01 NOTE — NON-PROVIDER
Pt. Here for NOB visit today.  # [phone]  First prenatal care  Pt. States she is having back pain, states she is also having vaginal itching and burning   Pharmacy verified  AFP, to late   Chaperone offered and not indicated  Pt given VALERIE sheet and instructions   Pt would like TDAP, consent signed   Pt would like BTL, consent signed

## 2021-09-02 DIAGNOSIS — O09.899 SHORT INTERVAL BETWEEN PREGNANCIES AFFECTING PREGNANCY, ANTEPARTUM: ICD-10-CM

## 2021-09-02 LAB
C TRACH DNA SPEC QL NAA+PROBE: NEGATIVE
CANDIDA DNA VAG QL PROBE+SIG AMP: POSITIVE
G VAGINALIS DNA VAG QL PROBE+SIG AMP: NEGATIVE
N GONORRHOEA DNA SPEC QL NAA+PROBE: NEGATIVE
SPECIMEN SOURCE: NORMAL
T VAGINALIS DNA VAG QL PROBE+SIG AMP: NEGATIVE

## 2021-09-03 ENCOUNTER — TELEPHONE (OUTPATIENT)
Dept: OBGYN | Facility: CLINIC | Age: 31
End: 2021-09-03

## 2021-09-03 ENCOUNTER — APPOINTMENT (OUTPATIENT)
Dept: OBGYN | Facility: CLINIC | Age: 31
End: 2021-09-03
Payer: COMMERCIAL

## 2021-09-03 NOTE — TELEPHONE ENCOUNTER
----- Message from LEANN Clayton sent at 9/3/2021  9:45 AM PDT -----  Let pt know she has yeast and can use monistat for 7 nights.         9/3/2021 1613 Left message for pt to call back regarding lab results.   9/7/2021 1408 called pt and notified as above. Advised to follow instructions on the box but to insert cream when ready to stay in bed and to avoid intercourse while using tx. Pt agreed and verbalized understanding.

## 2021-09-09 ENCOUNTER — APPOINTMENT (OUTPATIENT)
Dept: RADIOLOGY | Facility: IMAGING CENTER | Age: 31
End: 2021-09-09
Attending: NURSE PRACTITIONER
Payer: COMMERCIAL

## 2021-09-09 DIAGNOSIS — O09.899 SHORT INTERVAL BETWEEN PREGNANCIES AFFECTING PREGNANCY, ANTEPARTUM: ICD-10-CM

## 2021-09-09 PROCEDURE — 76805 OB US >/= 14 WKS SNGL FETUS: CPT | Mod: TC | Performed by: NURSE PRACTITIONER

## 2021-09-10 ENCOUNTER — TELEPHONE (OUTPATIENT)
Dept: OBGYN | Facility: CLINIC | Age: 31
End: 2021-09-10

## 2021-09-10 DIAGNOSIS — O28.3 ABNORMAL PREGNANCY US: ICD-10-CM

## 2021-09-10 NOTE — TELEPHONE ENCOUNTER
----- Message from LEANN Clayton sent at 9/10/2021  8:22 AM PDT -----  Pt to see MFM for f/u assessment of fetal heart.       Called pt and notified they need to recheck baby's heart d/t limited views on her US. Provider is recommending to f/u with perinatology. Explained referral was placed today and it usually take about a week for the perinatology office to call her and schedule an appt. Advised pt to let us know of her next appt on 9/15/2021 if she has not receive a call from the specialist. Pt agreed and verbalized understanding.

## 2021-09-15 ENCOUNTER — ROUTINE PRENATAL (OUTPATIENT)
Dept: OBGYN | Facility: CLINIC | Age: 31
End: 2021-09-15
Payer: COMMERCIAL

## 2021-09-15 VITALS — DIASTOLIC BLOOD PRESSURE: 60 MMHG | SYSTOLIC BLOOD PRESSURE: 118 MMHG | WEIGHT: 195 LBS | BODY MASS INDEX: 35.67 KG/M2

## 2021-09-15 DIAGNOSIS — O09.893 SUPERVISION OF OTHER HIGH RISK PREGNANCIES, THIRD TRIMESTER: Primary | ICD-10-CM

## 2021-09-15 PROCEDURE — 90040 PR PRENATAL FOLLOW UP: CPT | Performed by: NURSE PRACTITIONER

## 2021-09-15 ASSESSMENT — FIBROSIS 4 INDEX: FIB4 SCORE: 0.59

## 2021-09-15 NOTE — NON-PROVIDER
SUBJECTIVE:  Pt is a 31 y.o.   at 31w6d  gestation. Presents today for follow-up prenatal care. Reports no issues at this time.  Reports good  fetal movement. Denies regular cramping/contractions, bleeding or leaking of fluid. Denies dysuria, headaches, N/V. Generally feels well today except having some contractions. Took monistat for yeast and symptoms resolved.    OBJECTIVE:  - See prenatal vitals flow  -   Vitals:    09/15/21 1555   BP: 118/60   Weight: 88.5 kg (195 lb)                 ASSESSMENT:   - IUP at 31w6d    - S=D   - Abnormal US possible AFO  Patient Active Problem List    Diagnosis Date Noted   • Abnormal pregnancy US 09/10/2021   • Short interval between pregnancies affecting pregnancy 2021   • History of tuberculosis 2021   • History of blood transfusion due to anemia after  2021   • History of  2021   • History of pre-eclampsia 2021         PLAN:  - S/sx pregnancy and labor warning signs vs general discomforts discussed  - Fetal movements and/or kick counts reviewed   - Adequate hydration reinforced  - Nutrition/exercise/vitamin education; continue PNV   - S/p Tdap vacc    - reviewed ultrasound referral given to Dr. Bhatti  - Anticipatory guidance given  - RTC in 2 weeks for follow-up prenatal care

## 2021-09-15 NOTE — PROGRESS NOTES
OB follow up   + fetal movement.  No VB, LOF or UC's.  Phone # [phone]  Preferred pharmacy confirmed.  PNP, 1 gtt not done yet, will do tomorrow  ROBERTO; Dr Bhatti no appt scheduled yet. Patient will call tomorrow

## 2021-09-15 NOTE — PROGRESS NOTES
Pt without concerns today.   Tx self with monistat and symptoms gone.   Reviewed her US with her and explained the AFO.  Given information for Dr Bhatti office and to call for appt.  Reassurance provided.  Plans to get 3rd trimester labs done prior to next appt.  RTC 2 weeks or PRN.

## 2021-09-22 ENCOUNTER — TELEPHONE (OUTPATIENT)
Dept: OBGYN | Facility: CLINIC | Age: 31
End: 2021-09-22

## 2021-09-22 NOTE — TELEPHONE ENCOUNTER
Pt called stating she has not received a phone call to schedule an appointment with Dr. Bhatti. After reviewing pt chart her referral was authorized and I called Dr. Bhatti office and spoke to Jennyfer who stated she will be calling her today to schedule her.

## 2021-09-25 ENCOUNTER — HOSPITAL ENCOUNTER (OUTPATIENT)
Dept: LAB | Facility: MEDICAL CENTER | Age: 31
End: 2021-09-25
Attending: NURSE PRACTITIONER
Payer: COMMERCIAL

## 2021-09-25 DIAGNOSIS — O09.899 SHORT INTERVAL BETWEEN PREGNANCIES AFFECTING PREGNANCY, ANTEPARTUM: ICD-10-CM

## 2021-09-25 LAB
ABO GROUP BLD: NORMAL
ALBUMIN SERPL BCP-MCNC: 3.6 G/DL (ref 3.2–4.9)
ALBUMIN/GLOB SERPL: 1.1 G/DL
ALP SERPL-CCNC: 108 U/L (ref 30–99)
ALT SERPL-CCNC: 9 U/L (ref 2–50)
ANION GAP SERPL CALC-SCNC: 12 MMOL/L (ref 7–16)
APPEARANCE UR: CLEAR
AST SERPL-CCNC: 8 U/L (ref 12–45)
BASOPHILS # BLD AUTO: 0.1 % (ref 0–1.8)
BASOPHILS # BLD: 0.01 K/UL (ref 0–0.12)
BILIRUB SERPL-MCNC: 0.4 MG/DL (ref 0.1–1.5)
BILIRUB UR QL STRIP.AUTO: NEGATIVE
BLD GP AB SCN SERPL QL: NORMAL
BUN SERPL-MCNC: 5 MG/DL (ref 8–22)
CALCIUM SERPL-MCNC: 9 MG/DL (ref 8.5–10.5)
CHLORIDE SERPL-SCNC: 102 MMOL/L (ref 96–112)
CO2 SERPL-SCNC: 18 MMOL/L (ref 20–33)
COLOR UR: YELLOW
CREAT SERPL-MCNC: 0.4 MG/DL (ref 0.5–1.4)
CREAT UR-MCNC: 58.39 MG/DL
EOSINOPHIL # BLD AUTO: 0.08 K/UL (ref 0–0.51)
EOSINOPHIL NFR BLD: 0.9 % (ref 0–6.9)
ERYTHROCYTE [DISTWIDTH] IN BLOOD BY AUTOMATED COUNT: 43.2 FL (ref 35.9–50)
GLOBULIN SER CALC-MCNC: 3.4 G/DL (ref 1.9–3.5)
GLUCOSE 1H P 50 G GLC PO SERPL-MCNC: 177 MG/DL (ref 70–139)
GLUCOSE SERPL-MCNC: 180 MG/DL (ref 65–99)
GLUCOSE UR STRIP.AUTO-MCNC: NEGATIVE MG/DL
HBV SURFACE AG SER QL: ABNORMAL
HCT VFR BLD AUTO: 33.5 % (ref 37–47)
HCV AB SER QL: NORMAL
HGB BLD-MCNC: 11 G/DL (ref 12–16)
HIV 1+2 AB+HIV1 P24 AG SERPL QL IA: NORMAL
IMM GRANULOCYTES # BLD AUTO: 0.08 K/UL (ref 0–0.11)
IMM GRANULOCYTES NFR BLD AUTO: 0.9 % (ref 0–0.9)
KETONES UR STRIP.AUTO-MCNC: NEGATIVE MG/DL
LEUKOCYTE ESTERASE UR QL STRIP.AUTO: NEGATIVE
LYMPHOCYTES # BLD AUTO: 1.41 K/UL (ref 1–4.8)
LYMPHOCYTES NFR BLD: 16.6 % (ref 22–41)
MCH RBC QN AUTO: 26.6 PG (ref 27–33)
MCHC RBC AUTO-ENTMCNC: 32.8 G/DL (ref 33.6–35)
MCV RBC AUTO: 80.9 FL (ref 81.4–97.8)
MICRO URNS: ABNORMAL
MONOCYTES # BLD AUTO: 0.4 K/UL (ref 0–0.85)
MONOCYTES NFR BLD AUTO: 4.7 % (ref 0–13.4)
NEUTROPHILS # BLD AUTO: 6.49 K/UL (ref 2–7.15)
NEUTROPHILS NFR BLD: 76.8 % (ref 44–72)
NITRITE UR QL STRIP.AUTO: NEGATIVE
NRBC # BLD AUTO: 0 K/UL
NRBC BLD-RTO: 0 /100 WBC
PH UR STRIP.AUTO: 6.5 [PH] (ref 5–8)
PLATELET # BLD AUTO: 229 K/UL (ref 164–446)
PMV BLD AUTO: 10.1 FL (ref 9–12.9)
POTASSIUM SERPL-SCNC: 3.9 MMOL/L (ref 3.6–5.5)
PROT SERPL-MCNC: 7 G/DL (ref 6–8.2)
PROT UR QL STRIP: NEGATIVE MG/DL
PROT UR-MCNC: 10 MG/DL (ref 0–15)
PROT/CREAT UR: 171 MG/G (ref 10–107)
RBC # BLD AUTO: 4.14 M/UL (ref 4.2–5.4)
RBC UR QL AUTO: NEGATIVE
RH BLD: NORMAL
RUBV AB SER QL: 174 IU/ML
SODIUM SERPL-SCNC: 132 MMOL/L (ref 135–145)
SP GR UR STRIP.AUTO: 1.01
TREPONEMA PALLIDUM IGG+IGM AB [PRESENCE] IN SERUM OR PLASMA BY IMMUNOASSAY: ABNORMAL
UROBILINOGEN UR STRIP.AUTO-MCNC: 0.2 MG/DL
WBC # BLD AUTO: 8.5 K/UL (ref 4.8–10.8)

## 2021-09-25 PROCEDURE — 86803 HEPATITIS C AB TEST: CPT

## 2021-09-25 PROCEDURE — 82950 GLUCOSE TEST: CPT

## 2021-09-25 PROCEDURE — 80307 DRUG TEST PRSMV CHEM ANLYZR: CPT

## 2021-09-25 PROCEDURE — 86850 RBC ANTIBODY SCREEN: CPT

## 2021-09-25 PROCEDURE — 86900 BLOOD TYPING SEROLOGIC ABO: CPT

## 2021-09-25 PROCEDURE — 82570 ASSAY OF URINE CREATININE: CPT

## 2021-09-25 PROCEDURE — 87389 HIV-1 AG W/HIV-1&-2 AB AG IA: CPT

## 2021-09-25 PROCEDURE — 86780 TREPONEMA PALLIDUM: CPT

## 2021-09-25 PROCEDURE — 36415 COLL VENOUS BLD VENIPUNCTURE: CPT

## 2021-09-25 PROCEDURE — 86762 RUBELLA ANTIBODY: CPT

## 2021-09-25 PROCEDURE — 84156 ASSAY OF PROTEIN URINE: CPT

## 2021-09-25 PROCEDURE — 81003 URINALYSIS AUTO W/O SCOPE: CPT

## 2021-09-25 PROCEDURE — 80053 COMPREHEN METABOLIC PANEL: CPT

## 2021-09-25 PROCEDURE — 86901 BLOOD TYPING SEROLOGIC RH(D): CPT

## 2021-09-25 PROCEDURE — 85025 COMPLETE CBC W/AUTO DIFF WBC: CPT

## 2021-09-25 PROCEDURE — 87340 HEPATITIS B SURFACE AG IA: CPT

## 2021-09-27 DIAGNOSIS — R73.09 ABNORMAL GLUCOSE TOLERANCE TEST: ICD-10-CM

## 2021-09-27 LAB
AMPHET CTO UR CFM-MCNC: NEGATIVE NG/ML
BARBITURATES CTO UR CFM-MCNC: NEGATIVE NG/ML
BENZODIAZ CTO UR CFM-MCNC: NEGATIVE NG/ML
CANNABINOIDS CTO UR CFM-MCNC: NEGATIVE NG/ML
COCAINE CTO UR CFM-MCNC: NEGATIVE NG/ML
DRUG COMMENT 753798: NORMAL
METHADONE CTO UR CFM-MCNC: NEGATIVE NG/ML
OPIATES CTO UR CFM-MCNC: NEGATIVE NG/ML
PCP CTO UR CFM-MCNC: NEGATIVE NG/ML
PROPOXYPH CTO UR CFM-MCNC: NEGATIVE NG/ML

## 2021-09-29 ENCOUNTER — ROUTINE PRENATAL (OUTPATIENT)
Dept: OBGYN | Facility: CLINIC | Age: 31
End: 2021-09-29
Payer: COMMERCIAL

## 2021-09-29 VITALS — DIASTOLIC BLOOD PRESSURE: 70 MMHG | WEIGHT: 196 LBS | SYSTOLIC BLOOD PRESSURE: 110 MMHG | BODY MASS INDEX: 35.85 KG/M2

## 2021-09-29 DIAGNOSIS — O09.893 SUPERVISION OF OTHER HIGH RISK PREGNANCIES, THIRD TRIMESTER: Primary | ICD-10-CM

## 2021-09-29 PROCEDURE — 90471 IMMUNIZATION ADMIN: CPT | Performed by: NURSE PRACTITIONER

## 2021-09-29 PROCEDURE — 90040 PR PRENATAL FOLLOW UP: CPT | Performed by: NURSE PRACTITIONER

## 2021-09-29 PROCEDURE — 90686 IIV4 VACC NO PRSV 0.5 ML IM: CPT | Performed by: NURSE PRACTITIONER

## 2021-09-29 ASSESSMENT — FIBROSIS 4 INDEX: FIB4 SCORE: 0.34

## 2021-09-29 NOTE — PROGRESS NOTES
S:  Has been having trouble sleeping.  Has a lot of heartburn at night.  Needs a letter for [place] parents [place].  Reports good FM.  Denies VB, LOF, RUCs or vaginal DC.      O:    Vitals:    21 1602   BP: 110/70   Weight: 88.9 kg (196 lb)     See flow sheet.    A:  IUP at 33w6d  Patient Active Problem List    Diagnosis Date Noted   • Abnormal GTT (glucose tolerance test) 2021   • Abnormal pregnancy US 09/10/2021   • Short interval between pregnancies affecting pregnancy 2021   • History of tuberculosis 2021   • History of blood transfusion due to anemia after  2021   • History of  2021   • History of pre-eclampsia 2021        P:  1.  GBS @ 36 wks.          2.  Continue FKCs.          3.  Questions answered.          4.  L&D policies reviewed w pt.        5.  Encourage adequate water intake.        6.  F/u 2 wks.        7.  Keep Oki appt this week.         8.  D/w pt helps for heartburn. Handout given. Also talked about helps for insomnia.        9.  Letter for immigration given.     10.  Flu vaccine given.    I have placed the below orders and discussed them with an approved delegating provider. The MA is performing the below orders under the direction of  Dr. Au.

## 2021-09-29 NOTE — LETTER
2021            Lay Cooper is pregnant with an estimated delivery date of 21 at this time with a high risk pregnancy.  She needs the support of her parents at the time of delivery.  Her parents are Fabián Robertson ( 1968) and Darlene Robertson.  Please let me know if you have questions.        Thank you,          ESTELA Seth.P.R.N.    Electronically Signed

## 2021-09-29 NOTE — PROGRESS NOTES
Pt here today for OB follow up  Pt states no VB or LOF   Reports +FM  Good # [phone]   Pharmacy Confirmed.  Chaperone offered and not provided.    Flu today

## 2021-09-29 NOTE — LETTER
2021            Lay Cooper is pregnant with an estimated delivery date of 21 at this time with a high risk pregnancy.  She needs the support of her parents at the time of delivery.  Her parents are Fabián Robertson ( 1968) and Darlene Robertson (: 1970).  Please let me know if you have questions.        Thank you,          ESTELA [email or web address].    Electronically Signed

## 2021-10-13 ENCOUNTER — HOSPITAL ENCOUNTER (OUTPATIENT)
Facility: MEDICAL CENTER | Age: 31
End: 2021-10-13
Attending: NURSE PRACTITIONER
Payer: COMMERCIAL

## 2021-10-13 ENCOUNTER — ROUTINE PRENATAL (OUTPATIENT)
Dept: OBGYN | Facility: CLINIC | Age: 31
End: 2021-10-13
Payer: COMMERCIAL

## 2021-10-13 VITALS — SYSTOLIC BLOOD PRESSURE: 110 MMHG | DIASTOLIC BLOOD PRESSURE: 70 MMHG | WEIGHT: 197.5 LBS | BODY MASS INDEX: 36.12 KG/M2

## 2021-10-13 DIAGNOSIS — O09.899 SUPERVISION OF OTHER HIGH RISK PREGNANCY, ANTEPARTUM: Primary | ICD-10-CM

## 2021-10-13 DIAGNOSIS — O09.899 SUPERVISION OF OTHER HIGH RISK PREGNANCY, ANTEPARTUM: ICD-10-CM

## 2021-10-13 PROCEDURE — 87150 DNA/RNA AMPLIFIED PROBE: CPT

## 2021-10-13 PROCEDURE — 90040 PR PRENATAL FOLLOW UP: CPT | Performed by: NURSE PRACTITIONER

## 2021-10-13 PROCEDURE — 87081 CULTURE SCREEN ONLY: CPT

## 2021-10-13 ASSESSMENT — FIBROSIS 4 INDEX: FIB4 SCORE: 0.34

## 2021-10-13 NOTE — PROGRESS NOTES
No complaints today  GBS collected today  Refusal for 3 hr signed today, not able to complete  Labor precautions reviewed, all questions answered  Other labs and US up to date    Gabrielle Lopez CNM, APRN

## 2021-10-13 NOTE — NON-PROVIDER
Patient here for TACOS visit at 35w6d of pregnancy. She endorses fetal movement, denies vaginal bleeding or cramping. Reports irregular cramping/contractions.    She reports overall today she is feeling well and without complaints. No recent ER visits since last seen.     GBS collected today.     Adequate hydration reviewed in detail with patient. Precautions and warning signs reviewed with patient. Reviewed COVID related policies in relation to office and hospital. Patient aware that recommendations may change with changing infection numbers. RTC in  weeks for TACOS visit.

## 2021-10-13 NOTE — PROGRESS NOTES
OB follow up   + fetal movement. Active  No VB, LOF or UC's.  Phone # [phone]  Preferred pharmacy confirmed.  Flu vaccine Received   C/o  Cramping

## 2021-10-15 LAB — GP B STREP DNA SPEC QL NAA+PROBE: NEGATIVE

## 2021-10-16 ENCOUNTER — HOSPITAL ENCOUNTER (OUTPATIENT)
Dept: LAB | Facility: MEDICAL CENTER | Age: 31
End: 2021-10-16
Attending: NURSE PRACTITIONER
Payer: COMMERCIAL

## 2021-10-16 DIAGNOSIS — R73.09 ABNORMAL GLUCOSE TOLERANCE TEST: ICD-10-CM

## 2021-10-16 LAB
GLUCOSE 1H P CHAL SERPL-MCNC: 171 MG/DL (ref 65–180)
GLUCOSE 2H P CHAL SERPL-MCNC: 154 MG/DL (ref 65–155)
GLUCOSE 3H P CHAL SERPL-MCNC: 107 MG/DL (ref 65–140)
GLUCOSE BS SERPL-MCNC: 108 MG/DL (ref 65–95)

## 2021-10-16 PROCEDURE — 36415 COLL VENOUS BLD VENIPUNCTURE: CPT

## 2021-10-16 PROCEDURE — 82951 GLUCOSE TOLERANCE TEST (GTT): CPT

## 2021-10-16 PROCEDURE — 82952 GTT-ADDED SAMPLES: CPT

## 2021-10-20 ENCOUNTER — ROUTINE PRENATAL (OUTPATIENT)
Dept: OBGYN | Facility: CLINIC | Age: 31
End: 2021-10-20
Payer: COMMERCIAL

## 2021-10-20 VITALS — SYSTOLIC BLOOD PRESSURE: 110 MMHG | BODY MASS INDEX: 36.21 KG/M2 | DIASTOLIC BLOOD PRESSURE: 66 MMHG | WEIGHT: 198 LBS

## 2021-10-20 DIAGNOSIS — Z34.83 ENCOUNTER FOR SUPERVISION OF OTHER NORMAL PREGNANCY, THIRD TRIMESTER: ICD-10-CM

## 2021-10-20 PROCEDURE — 90040 PR PRENATAL FOLLOW UP: CPT | Performed by: ADVANCED PRACTICE MIDWIFE

## 2021-10-20 ASSESSMENT — FIBROSIS 4 INDEX: FIB4 SCORE: 0.34

## 2021-10-20 NOTE — NON-PROVIDER
Patient here for TACOS visit at 36w6d of pregnancy. She endorses fetal movement, denies vaginal bleeding or LOF. No recent ER visits since last seen.    She reports an increase in irregular contractions the past week. Monday they were consistently q 20 min. Relieved by rest and hydration.    Discuss GBS negative and 3 hr GTT WNL.    Adequate hydration reviewed in detail with patient. Precautions and warning signs reviewed with patient. Reviewed COVID related policies in relation to office and hospital. Patient aware that recommendations may change with changing infection numbers. RTC in 1 week for TACOS visit.

## 2021-10-20 NOTE — NON-PROVIDER
Pt here today for OB follow up  Pt states no complaints   Reports +FM  Good # [phone]  Pharmacy Confirmed.  Chaperone offered and not indicated.   GBS negative

## 2021-10-27 ENCOUNTER — ROUTINE PRENATAL (OUTPATIENT)
Dept: OBGYN | Facility: CLINIC | Age: 31
End: 2021-10-27
Payer: COMMERCIAL

## 2021-10-27 VITALS — BODY MASS INDEX: 36.21 KG/M2 | DIASTOLIC BLOOD PRESSURE: 74 MMHG | SYSTOLIC BLOOD PRESSURE: 118 MMHG | WEIGHT: 198 LBS

## 2021-10-27 DIAGNOSIS — O09.899 SUPERVISION OF OTHER HIGH RISK PREGNANCY, ANTEPARTUM: Primary | ICD-10-CM

## 2021-10-27 PROCEDURE — 90040 PR PRENATAL FOLLOW UP: CPT | Performed by: NURSE PRACTITIONER

## 2021-10-27 ASSESSMENT — FIBROSIS 4 INDEX: FIB4 SCORE: 0.34

## 2021-10-27 NOTE — PROGRESS NOTES
Complains of irregular UC's  Labor precautions reviewed, desires TOLAC  GBS negative  All other labs and US up to date  IOL referral placed today  All questions answered    Gabrielle Lopez CNM, APRN

## 2021-10-27 NOTE — PROGRESS NOTES
OB follow up   + fetal movement. Active  No VB, LOF or UC's.  Phone # [phone]  Preferred pharmacy confirmed.  Flu vaccine Received   No complaints as of today

## 2021-10-31 ENCOUNTER — ANESTHESIA (OUTPATIENT)
Dept: ANESTHESIOLOGY | Facility: MEDICAL CENTER | Age: 31
End: 2021-10-31

## 2021-10-31 ENCOUNTER — HOSPITAL ENCOUNTER (INPATIENT)
Facility: MEDICAL CENTER | Age: 31
LOS: 1 days | End: 2021-11-01
Attending: OBSTETRICS & GYNECOLOGY | Admitting: OBSTETRICS & GYNECOLOGY
Payer: COMMERCIAL

## 2021-10-31 ENCOUNTER — ANESTHESIA EVENT (OUTPATIENT)
Dept: ANESTHESIOLOGY | Facility: MEDICAL CENTER | Age: 31
End: 2021-10-31

## 2021-10-31 DIAGNOSIS — O28.3 ABNORMAL PREGNANCY US: ICD-10-CM

## 2021-10-31 DIAGNOSIS — Z98.891 HISTORY OF VBAC: ICD-10-CM

## 2021-10-31 DIAGNOSIS — O09.899 SHORT INTERVAL BETWEEN PREGNANCIES AFFECTING PREGNANCY, ANTEPARTUM: ICD-10-CM

## 2021-10-31 DIAGNOSIS — Z86.11 HISTORY OF TUBERCULOSIS: ICD-10-CM

## 2021-10-31 DIAGNOSIS — Z87.59 HISTORY OF PRE-ECLAMPSIA: ICD-10-CM

## 2021-10-31 DIAGNOSIS — R73.09 ABNORMAL GTT (GLUCOSE TOLERANCE TEST): ICD-10-CM

## 2021-10-31 DIAGNOSIS — Z92.89 HISTORY OF BLOOD TRANSFUSION: ICD-10-CM

## 2021-10-31 DIAGNOSIS — O09.899 SUPERVISION OF OTHER HIGH RISK PREGNANCY, ANTEPARTUM: ICD-10-CM

## 2021-10-31 LAB
ALBUMIN SERPL BCP-MCNC: 3.7 G/DL (ref 3.2–4.9)
ALBUMIN/GLOB SERPL: 1.1 G/DL
ALP SERPL-CCNC: 154 U/L (ref 30–99)
ALT SERPL-CCNC: 5 U/L (ref 2–50)
ANION GAP SERPL CALC-SCNC: 15 MMOL/L (ref 7–16)
AST SERPL-CCNC: 13 U/L (ref 12–45)
BASOPHILS # BLD AUTO: 0.3 % (ref 0–1.8)
BASOPHILS # BLD: 0.02 K/UL (ref 0–0.12)
BILIRUB SERPL-MCNC: 0.7 MG/DL (ref 0.1–1.5)
BUN SERPL-MCNC: 4 MG/DL (ref 8–22)
CALCIUM SERPL-MCNC: 9.1 MG/DL (ref 8.5–10.5)
CHLORIDE SERPL-SCNC: 104 MMOL/L (ref 96–112)
CO2 SERPL-SCNC: 16 MMOL/L (ref 20–33)
CREAT SERPL-MCNC: 0.34 MG/DL (ref 0.5–1.4)
EOSINOPHIL # BLD AUTO: 0.1 K/UL (ref 0–0.51)
EOSINOPHIL NFR BLD: 1.3 % (ref 0–6.9)
ERYTHROCYTE [DISTWIDTH] IN BLOOD BY AUTOMATED COUNT: 43.4 FL (ref 35.9–50)
GLOBULIN SER CALC-MCNC: 3.4 G/DL (ref 1.9–3.5)
GLUCOSE SERPL-MCNC: 119 MG/DL (ref 65–99)
HCT VFR BLD AUTO: 36.2 % (ref 37–47)
HGB BLD-MCNC: 11.3 G/DL (ref 12–16)
HOLDING TUBE BB 8507: NORMAL
IMM GRANULOCYTES # BLD AUTO: 0.05 K/UL (ref 0–0.11)
IMM GRANULOCYTES NFR BLD AUTO: 0.6 % (ref 0–0.9)
LYMPHOCYTES # BLD AUTO: 2.06 K/UL (ref 1–4.8)
LYMPHOCYTES NFR BLD: 25.9 % (ref 22–41)
MCH RBC QN AUTO: 23.7 PG (ref 27–33)
MCHC RBC AUTO-ENTMCNC: 31.2 G/DL (ref 33.6–35)
MCV RBC AUTO: 75.9 FL (ref 81.4–97.8)
MONOCYTES # BLD AUTO: 0.37 K/UL (ref 0–0.85)
MONOCYTES NFR BLD AUTO: 4.7 % (ref 0–13.4)
NEUTROPHILS # BLD AUTO: 5.35 K/UL (ref 2–7.15)
NEUTROPHILS NFR BLD: 67.2 % (ref 44–72)
NRBC # BLD AUTO: 0 K/UL
NRBC BLD-RTO: 0 /100 WBC
PLATELET # BLD AUTO: 247 K/UL (ref 164–446)
PMV BLD AUTO: 10.3 FL (ref 9–12.9)
POTASSIUM SERPL-SCNC: 3.6 MMOL/L (ref 3.6–5.5)
PROT SERPL-MCNC: 7.1 G/DL (ref 6–8.2)
RBC # BLD AUTO: 4.77 M/UL (ref 4.2–5.4)
SARS-COV+SARS-COV-2 AG RESP QL IA.RAPID: NOTDETECTED
SODIUM SERPL-SCNC: 135 MMOL/L (ref 135–145)
SPECIMEN SOURCE: NORMAL
URATE SERPL-MCNC: 4.6 MG/DL (ref 1.9–8.2)
WBC # BLD AUTO: 8 K/UL (ref 4.8–10.8)

## 2021-10-31 PROCEDURE — 304965 HCHG RECOVERY SERVICES

## 2021-10-31 PROCEDURE — 59409 OBSTETRICAL CARE: CPT

## 2021-10-31 PROCEDURE — A9270 NON-COVERED ITEM OR SERVICE: HCPCS | Performed by: ADVANCED PRACTICE MIDWIFE

## 2021-10-31 PROCEDURE — 700111 HCHG RX REV CODE 636 W/ 250 OVERRIDE (IP)

## 2021-10-31 PROCEDURE — 700111 HCHG RX REV CODE 636 W/ 250 OVERRIDE (IP): Performed by: OBSTETRICS & GYNECOLOGY

## 2021-10-31 PROCEDURE — 700105 HCHG RX REV CODE 258: Performed by: OBSTETRICS & GYNECOLOGY

## 2021-10-31 PROCEDURE — A9270 NON-COVERED ITEM OR SERVICE: HCPCS | Performed by: OBSTETRICS & GYNECOLOGY

## 2021-10-31 PROCEDURE — 85025 COMPLETE CBC W/AUTO DIFF WBC: CPT

## 2021-10-31 PROCEDURE — 303615 HCHG EPIDURAL/SPINAL ANESTHESIA FOR LABOR

## 2021-10-31 PROCEDURE — 3E0S3BZ INTRODUCTION OF ANESTHETIC AGENT INTO EPIDURAL SPACE, PERCUTANEOUS APPROACH: ICD-10-PCS | Performed by: ANESTHESIOLOGY

## 2021-10-31 PROCEDURE — 700101 HCHG RX REV CODE 250: Performed by: ANESTHESIOLOGY

## 2021-10-31 PROCEDURE — 80053 COMPREHEN METABOLIC PANEL: CPT

## 2021-10-31 PROCEDURE — 700102 HCHG RX REV CODE 250 W/ 637 OVERRIDE(OP): Performed by: ADVANCED PRACTICE MIDWIFE

## 2021-10-31 PROCEDURE — 770002 HCHG ROOM/CARE - OB PRIVATE (112)

## 2021-10-31 PROCEDURE — 700111 HCHG RX REV CODE 636 W/ 250 OVERRIDE (IP): Performed by: ANESTHESIOLOGY

## 2021-10-31 PROCEDURE — 84550 ASSAY OF BLOOD/URIC ACID: CPT

## 2021-10-31 PROCEDURE — 36415 COLL VENOUS BLD VENIPUNCTURE: CPT

## 2021-10-31 PROCEDURE — 700102 HCHG RX REV CODE 250 W/ 637 OVERRIDE(OP): Performed by: OBSTETRICS & GYNECOLOGY

## 2021-10-31 PROCEDURE — 87426 SARSCOV CORONAVIRUS AG IA: CPT

## 2021-10-31 RX ORDER — SODIUM CHLORIDE, SODIUM LACTATE, POTASSIUM CHLORIDE, CALCIUM CHLORIDE 600; 310; 30; 20 MG/100ML; MG/100ML; MG/100ML; MG/100ML
INJECTION, SOLUTION INTRAVENOUS CONTINUOUS
Status: DISCONTINUED | OUTPATIENT
Start: 2021-10-31 | End: 2021-11-01 | Stop reason: HOSPADM

## 2021-10-31 RX ORDER — MISOPROSTOL 200 UG/1
600 TABLET ORAL
Status: DISCONTINUED | OUTPATIENT
Start: 2021-10-31 | End: 2021-10-31 | Stop reason: HOSPADM

## 2021-10-31 RX ORDER — SODIUM CHLORIDE, SODIUM LACTATE, POTASSIUM CHLORIDE, CALCIUM CHLORIDE 600; 310; 30; 20 MG/100ML; MG/100ML; MG/100ML; MG/100ML
INJECTION, SOLUTION INTRAVENOUS CONTINUOUS
Status: ACTIVE | OUTPATIENT
Start: 2021-10-31 | End: 2021-10-31

## 2021-10-31 RX ORDER — DOCUSATE SODIUM 100 MG/1
100 CAPSULE, LIQUID FILLED ORAL 2 TIMES DAILY PRN
Status: DISCONTINUED | OUTPATIENT
Start: 2021-10-31 | End: 2021-10-31 | Stop reason: HOSPADM

## 2021-10-31 RX ORDER — ACETAMINOPHEN 325 MG/1
325 TABLET ORAL EVERY 4 HOURS PRN
Status: DISCONTINUED | OUTPATIENT
Start: 2021-10-31 | End: 2021-10-31 | Stop reason: HOSPADM

## 2021-10-31 RX ORDER — ALUMINA, MAGNESIA, AND SIMETHICONE 2400; 2400; 240 MG/30ML; MG/30ML; MG/30ML
30 SUSPENSION ORAL EVERY 6 HOURS PRN
Status: DISCONTINUED | OUTPATIENT
Start: 2021-10-31 | End: 2021-10-31 | Stop reason: HOSPADM

## 2021-10-31 RX ORDER — MISOPROSTOL 200 UG/1
800 TABLET ORAL
Status: DISCONTINUED | OUTPATIENT
Start: 2021-10-31 | End: 2021-10-31 | Stop reason: HOSPADM

## 2021-10-31 RX ORDER — IBUPROFEN 800 MG/1
800 TABLET ORAL EVERY 8 HOURS PRN
Status: DISCONTINUED | OUTPATIENT
Start: 2021-10-31 | End: 2021-10-31 | Stop reason: HOSPADM

## 2021-10-31 RX ORDER — ROPIVACAINE HYDROCHLORIDE 2 MG/ML
INJECTION, SOLUTION EPIDURAL; INFILTRATION; PERINEURAL CONTINUOUS
Status: DISCONTINUED | OUTPATIENT
Start: 2021-10-31 | End: 2021-11-01 | Stop reason: HOSPADM

## 2021-10-31 RX ORDER — SODIUM CHLORIDE, SODIUM LACTATE, POTASSIUM CHLORIDE, CALCIUM CHLORIDE 600; 310; 30; 20 MG/100ML; MG/100ML; MG/100ML; MG/100ML
INJECTION, SOLUTION INTRAVENOUS PRN
Status: DISCONTINUED | OUTPATIENT
Start: 2021-10-31 | End: 2021-11-01 | Stop reason: HOSPADM

## 2021-10-31 RX ORDER — ACETAMINOPHEN 325 MG/1
650 TABLET ORAL EVERY 4 HOURS PRN
Status: DISCONTINUED | OUTPATIENT
Start: 2021-10-31 | End: 2021-10-31 | Stop reason: HOSPADM

## 2021-10-31 RX ORDER — ONDANSETRON 4 MG/1
4 TABLET, ORALLY DISINTEGRATING ORAL EVERY 6 HOURS PRN
Status: DISCONTINUED | OUTPATIENT
Start: 2021-10-31 | End: 2021-10-31 | Stop reason: HOSPADM

## 2021-10-31 RX ORDER — SODIUM CHLORIDE, SODIUM LACTATE, POTASSIUM CHLORIDE, AND CALCIUM CHLORIDE .6; .31; .03; .02 G/100ML; G/100ML; G/100ML; G/100ML
250 INJECTION, SOLUTION INTRAVENOUS PRN
Status: DISCONTINUED | OUTPATIENT
Start: 2021-10-31 | End: 2021-10-31 | Stop reason: HOSPADM

## 2021-10-31 RX ORDER — HYDROXYZINE 50 MG/1
50 TABLET, FILM COATED ORAL EVERY 6 HOURS PRN
Status: DISCONTINUED | OUTPATIENT
Start: 2021-10-31 | End: 2021-10-31 | Stop reason: HOSPADM

## 2021-10-31 RX ORDER — IBUPROFEN 600 MG/1
600 TABLET ORAL EVERY 6 HOURS PRN
Status: DISCONTINUED | OUTPATIENT
Start: 2021-10-31 | End: 2021-11-01 | Stop reason: HOSPADM

## 2021-10-31 RX ORDER — LIDOCAINE HYDROCHLORIDE AND EPINEPHRINE 15; 5 MG/ML; UG/ML
INJECTION, SOLUTION EPIDURAL
Status: COMPLETED | OUTPATIENT
Start: 2021-10-31 | End: 2021-10-31

## 2021-10-31 RX ORDER — DOCUSATE SODIUM 100 MG/1
100 CAPSULE, LIQUID FILLED ORAL 2 TIMES DAILY PRN
Status: DISCONTINUED | OUTPATIENT
Start: 2021-10-31 | End: 2021-11-01 | Stop reason: HOSPADM

## 2021-10-31 RX ORDER — SODIUM CHLORIDE, SODIUM LACTATE, POTASSIUM CHLORIDE, CALCIUM CHLORIDE 600; 310; 30; 20 MG/100ML; MG/100ML; MG/100ML; MG/100ML
INJECTION, SOLUTION INTRAVENOUS PRN
Status: DISCONTINUED | OUTPATIENT
Start: 2021-10-31 | End: 2021-10-31 | Stop reason: HOSPADM

## 2021-10-31 RX ORDER — ONDANSETRON 2 MG/ML
4 INJECTION INTRAMUSCULAR; INTRAVENOUS EVERY 6 HOURS PRN
Status: DISCONTINUED | OUTPATIENT
Start: 2021-10-31 | End: 2021-10-31 | Stop reason: HOSPADM

## 2021-10-31 RX ORDER — SODIUM CHLORIDE, SODIUM LACTATE, POTASSIUM CHLORIDE, AND CALCIUM CHLORIDE .6; .31; .03; .02 G/100ML; G/100ML; G/100ML; G/100ML
1000 INJECTION, SOLUTION INTRAVENOUS
Status: DISCONTINUED | OUTPATIENT
Start: 2021-10-31 | End: 2021-10-31 | Stop reason: HOSPADM

## 2021-10-31 RX ORDER — HYDROCODONE BITARTRATE AND ACETAMINOPHEN 5; 325 MG/1; MG/1
1 TABLET ORAL EVERY 8 HOURS PRN
Status: DISCONTINUED | OUTPATIENT
Start: 2021-10-31 | End: 2021-11-01 | Stop reason: HOSPADM

## 2021-10-31 RX ADMIN — LIDOCAINE HYDROCHLORIDE,EPINEPHRINE BITARTRATE 5 ML: 15; .005 INJECTION, SOLUTION EPIDURAL; INFILTRATION; INTRACAUDAL; PERINEURAL at 11:41

## 2021-10-31 RX ADMIN — SODIUM CHLORIDE, POTASSIUM CHLORIDE, SODIUM LACTATE AND CALCIUM CHLORIDE: 600; 310; 30; 20 INJECTION, SOLUTION INTRAVENOUS at 11:00

## 2021-10-31 RX ADMIN — FENTANYL CITRATE 100 MCG: 50 INJECTION, SOLUTION INTRAMUSCULAR; INTRAVENOUS at 11:14

## 2021-10-31 RX ADMIN — ROPIVACAINE HYDROCHLORIDE: 2 INJECTION, SOLUTION EPIDURAL; INFILTRATION at 11:58

## 2021-10-31 RX ADMIN — OXYTOCIN 125 ML/HR: 10 INJECTION, SOLUTION INTRAMUSCULAR; INTRAVENOUS at 14:24

## 2021-10-31 RX ADMIN — IBUPROFEN 600 MG: 600 TABLET, FILM COATED ORAL at 22:14

## 2021-10-31 RX ADMIN — OXYTOCIN 350 ML/HR: 10 INJECTION, SOLUTION INTRAMUSCULAR; INTRAVENOUS at 12:40

## 2021-10-31 RX ADMIN — IBUPROFEN 800 MG: 800 TABLET, FILM COATED ORAL at 13:33

## 2021-10-31 ASSESSMENT — COPD QUESTIONNAIRES
HAVE YOU SMOKED AT LEAST 100 CIGARETTES IN YOUR ENTIRE LIFE: NO/DON'T KNOW
DO YOU EVER COUGH UP ANY MUCUS OR PHLEGM?: NO/ONLY WITH OCCASIONAL COLDS OR INFECTIONS
IN THE PAST 12 MONTHS DO YOU DO LESS THAN YOU USED TO BECAUSE OF YOUR BREATHING PROBLEMS: DISAGREE/UNSURE
COPD SCREENING SCORE: 0
DURING THE PAST 4 WEEKS HOW MUCH DID YOU FEEL SHORT OF BREATH: NONE/LITTLE OF THE TIME

## 2021-10-31 ASSESSMENT — PATIENT HEALTH QUESTIONNAIRE - PHQ9
1. LITTLE INTEREST OR PLEASURE IN DOING THINGS: NOT AT ALL
2. FEELING DOWN, DEPRESSED, IRRITABLE, OR HOPELESS: NOT AT ALL
SUM OF ALL RESPONSES TO PHQ9 QUESTIONS 1 AND 2: 0

## 2021-10-31 ASSESSMENT — LIFESTYLE VARIABLES
CONSUMPTION TOTAL: NEGATIVE
HAVE YOU EVER FELT YOU SHOULD CUT DOWN ON YOUR DRINKING: NO
HAVE PEOPLE ANNOYED YOU BY CRITICIZING YOUR DRINKING: NO
TOTAL SCORE: 0
EVER FELT BAD OR GUILTY ABOUT YOUR DRINKING: NO
TOTAL SCORE: 0
HOW MANY TIMES IN THE PAST YEAR HAVE YOU HAD 5 OR MORE DRINKS IN A DAY: 0
TOTAL SCORE: 0
AVERAGE NUMBER OF DAYS PER WEEK YOU HAVE A DRINK CONTAINING ALCOHOL: 0
EVER_SMOKED: NEVER
ALCOHOL_USE: NO
EVER HAD A DRINK FIRST THING IN THE MORNING TO STEADY YOUR NERVES TO GET RID OF A HANGOVER: NO
ON A TYPICAL DAY WHEN YOU DRINK ALCOHOL HOW MANY DRINKS DO YOU HAVE: 0

## 2021-10-31 ASSESSMENT — PAIN DESCRIPTION - PAIN TYPE
TYPE: ACUTE PAIN
TYPE: ACUTE PAIN

## 2021-10-31 ASSESSMENT — FIBROSIS 4 INDEX: FIB4 SCORE: 0.34

## 2021-10-31 ASSESSMENT — PAIN SCALES - GENERAL: PAIN_LEVEL: 2

## 2021-10-31 NOTE — ANESTHESIA PROCEDURE NOTES
Epidural Block    Date/Time: 10/31/2021 11:41 AM  Performed by: Inna Harden M.D.  Authorized by: Inna Harden M.D.     Patient Location:  OB  Start Time:  10/31/2021 11:41 AM  Reason for Block: labor analgesia    patient identified, IV checked, site marked, risks and benefits discussed, surgical consent, monitors and equipment checked, pre-op evaluation and timeout performed    Patient Position:  Sitting  Prep: ChloraPrep, patient draped and sterile technique    Monitoring:  Blood pressure, continuous pulse oximetry and heart rate  Approach:  Midline  Location:  L3-L4  Injection Technique:  JIGNA saline  Skin infiltration:  Lidocaine  Strength:  1%  Dose:  3ml  Needle Type:  Tuohy  Needle Gauge:  17 G  Needle Length:  3.5 in  Loss of resistance::  7  Catheter Size:  19 G  Catheter at Skin Depth:  12  Test Dose Result:  Negative  Events: paresthesia-transient/resolved     DPE: successful first attempt, classic JIGNA with saline, clear CSF return with 25g Pencan, EZ cath thread with transient LLE paresthesia, negative aspiration x2, negative test dose x2 (3/2ml), negative aspiration between, all meds PF

## 2021-10-31 NOTE — H&P
History and Physical    Lay Cooper is a 31 y.o. female  at 38w3d who presents for labor    Subjective: 31-year-old  3 para 102 at 30 weeks 3 days gestational age.  Patient presents complaining of contractions.  Was found to be approximately 8 cm dilated at time of presentation.  History of  x1,  x1 would like another trial of labor after .  GBS negative  CTXs: positive   Pain: positive  LOF: negative  Vaginal bleeding: negative   Fetal movement: positive    ROS: Pertinent positives documented in HPI and all other systems reviewed & are negative    OB History    Para Term  AB Living   3 2 1 1   2   SAB TAB Ectopic Molar Multiple Live Births             2      # Outcome Date GA Lbr Anirudh/2nd Weight Sex Delivery Anes PTL Lv   3 Current            2 Term 20 38w1d  3.53 kg (7 lb 12.5 oz) M  None N MEMO   1  03/10/12 36w0d  2.722 kg (6 lb) F CS-LTranv  Y MEMO       PGYNHx: None    History reviewed. No pertinent past medical history.    Past Surgical History:   Procedure Laterality Date   • PRIMARY C SECTION           Current Facility-Administered Medications:   •  oxytocin (PITOCIN) 20 UNITS/1000ML LR, , , ,   •  fentaNYL (SUBLIMAZE) injection 100 mcg, 100 mcg, Intravenous, Q HOUR PRN, Everett Au M.D., 100 mcg at 10/31/21 1114  •  lactated ringers infusion, , Intravenous, Continuous, Everett Au M.D., Last Rate: 125 mL/hr at 10/31/21 1100, New Bag at 10/31/21 1100  •  oxytocin (PITOCIN) 20 UNITS/1000ML LR (postpartum),  mL/hr, Intravenous, Continuous, Everett Au M.D.  •  lactated ringers (BOLUS) infusion, 1,000 mL, Intravenous, Once PRN, Inna Harden M.D.  •  ropivacaine 0.2 % (NAROPIN) injection, , Epidural, Continuous, Inna Harden M.D., New Bag at 10/31/21 1158  •  ePHEDrine injection 5 mg, 5 mg, Intravenous, Q5 MIN PRN **AND** Notify Anesthesiologist if ephedrine given and for sustained hypotension (more than 2  minutes), , , Once **AND** For Hypotension: Place patient in left lateral tilt to achieve uterine displacement and elevate legs., , , PRN **AND** Hypotension: Oxygen Continuous, , , CONTINUOUS **AND** lactated ringers infusion (BOLUS), 250 mL, Intravenous, PRN, Inna Harden M.D.    Allergies: Patient has no known allergies.    Social History     Socioeconomic History   • Marital status:      Spouse name: Not on file   • Number of children: Not on file   • Years of education: Not on file   • Highest education level: Not on file   Occupational History   • Not on file   Tobacco Use   • Smoking status: Never Smoker   • Smokeless tobacco: Never Used   Vaping Use   • Vaping Use: Never used   Substance and Sexual Activity   • Alcohol use: Not Currently   • Drug use: Never   • Sexual activity: Yes     Partners: Male     Comment: None    Other Topics Concern   • Not on file   Social History Narrative   • Not on file     Social Determinants of Health     Financial Resource Strain:    • Difficulty of Paying Living Expenses:    Food Insecurity: Unknown   • Worried About Running Out of Food in the Last Year: Patient refused   • Ran Out of Food in the Last Year: Patient refused   Transportation Needs: Unknown   • Lack of Transportation (Medical): Patient refused   • Lack of Transportation (Non-Medical): Patient refused   Physical Activity:    • Days of Exercise per Week:    • Minutes of Exercise per Session:    Stress:    • Feeling of Stress :    Social Connections:    • Frequency of Communication with Friends and Family:    • Frequency of Social Gatherings with Friends and Family:    • Attends Confucianist Services:    • Active Member of Clubs or Organizations:    • Attends Club or Organization Meetings:    • Marital Status:    Intimate Partner Violence:    • Fear of Current or Ex-Partner:    • Emotionally Abused:    • Physically Abused:    • Sexually Abused:          FamHx: Noncontributory    [facility]renatal care with [facility] with following problems:  Patient Active Problem List    Diagnosis Date Noted   • Supervision of other high risk pregnancy, antepartum 10/13/2021   • Abnormal GTT (glucose tolerance test) 2021   • Abnormal pregnancy US 09/10/2021   • Short interval between pregnancies affecting pregnancy 2021   • History of tuberculosis 2021   • History of blood transfusion due to anemia after  2021   • History of  2021   • History of pre-eclampsia 2021         Objective:      /83   Pulse (!) 114   Temp 36.7 °C (98.1 °F) (Temporal)   Resp 16   Ht 1.626 m (5' 4\")   Wt 89.8 kg (198 lb)   SpO2 98%     General:   no acute distress, alert, cooperative   Skin:   normal   HEENT:  PERRLA, EOMI and Sclera clear, anicteric   Lungs:   CTA bilateral   Heart:   S1, S2 normal, no murmur, click, rub or gallop, regular rate and rhythm, chest is clear without rales or wheezing, no pedal edema, S1, S2 normal, no murmur, regular rate and rhythm   Abdomen:   soft, gravid, NT   EFW:  3200 g   Pelvis:  adequate with gynecoid pelvis   FHT:  130 BPM  Accels present  Decels absent  Variability moderate  Category 1   Uterine Size: S=D   Presentations: Cephalic   Cervix:     Dilation:  8 cm    Effacement: 100%    Station:  0    Consistency: Soft    Position: Anterior     Lab Review  Lab:   Blood type: O     Recent Results (from the past 5880 hour(s))   Chlamydia/GC PCR Urine Or Swab    Collection Time: 21  1:37 PM    Specimen: Genital   Result Value Ref Range    C. trachomatis by PCR Negative Negative    N. gonorrhoeae by PCR Negative Negative    Source Genital    VAGINAL PATHOGENS DNA PANEL    Collection Time: 21  1:37 PM   Result Value Ref Range    Candida species DNA Probe POSITIVE (A) Negative    Trichamonas vaginalis DNA Probe Negative Negative    Gardnerella vaginalis DNA Probe Negative Negative   PROTEIN/CREAT RATIO URINE    Collection Time: 21  1:58 PM   Result " Value Ref Range    Total Protein, Urine 10.0 0.0 - 15.0 mg/dL    Creatinine, Random Urine 58.39 mg/dL    Protein Creatinine Ratio 171 (H) 10 - 107 mg/g   Comp Metabolic Panel    Collection Time: 09/25/21  1:58 PM   Result Value Ref Range    Sodium 132 (L) 135 - 145 mmol/L    Potassium 3.9 3.6 - 5.5 mmol/L    Chloride 102 96 - 112 mmol/L    Co2 18 (L) 20 - 33 mmol/L    Anion Gap 12.0 7.0 - 16.0    Glucose 180 (H) 65 - 99 mg/dL    Bun 5 (L) 8 - 22 mg/dL    Creatinine 0.40 (L) 0.50 - 1.40 mg/dL    Calcium 9.0 8.5 - 10.5 mg/dL    AST(SGOT) 8 (L) 12 - 45 U/L    ALT(SGPT) 9 2 - 50 U/L    Alkaline Phosphatase 108 (H) 30 - 99 U/L    Total Bilirubin 0.4 0.1 - 1.5 mg/dL    Albumin 3.6 3.2 - 4.9 g/dL    Total Protein 7.0 6.0 - 8.2 g/dL    Globulin 3.4 1.9 - 3.5 g/dL    A-G Ratio 1.1 g/dL   GLUCOSE 1HR GESTATIONAL    Collection Time: 09/25/21  1:58 PM   Result Value Ref Range    Glucose, Post Dose 177 (H) 70 - 139 mg/dL   URINE DRUG SCREEN W/CONF (AR)    Collection Time: 09/25/21  1:58 PM   Result Value Ref Range    Urine Amphetamine-Methamphetam Negative Cutoff 300 ng/mL    Barbiturates Negative Cutoff 200 ng/mL    Benzodiazepines Negative Cutoff 200 ng/mL    Propoxyphene Negative Cutoff 300 ng/mL    Cocaine Metabolite Negative Cutoff 150 ng/mL    Methadone Negative Cutoff 150 ng/mL    Codeine-Morphine Negative Cutoff 300 ng/mL    Phencyclidine -Pcp Negative Cutoff 25 ng/mL    Cannabinoid Metab Negative Cutoff 20 ng/mL    Drug Comment Urine Drugs See Note    HEP C VIRUS ANTIBODY    Collection Time: 09/25/21  1:58 PM   Result Value Ref Range    Hepatitis C Antibody Non-Reactive Non-Reactive   PRENATAL PANEL 3+HIV+UACXI    Collection Time: 09/25/21  1:58 PM   Result Value Ref Range    Color Yellow     Character Clear     Specific Gravity 1.013 <1.035    Ph 6.5 5.0 - 8.0    Glucose Negative Negative mg/dL    Ketones Negative Negative mg/dL    Protein Negative Negative mg/dL    Bilirubin Negative Negative    Urobilinogen, Urine  0.2 Negative    Nitrite Negative Negative    Leukocyte Esterase Negative Negative    Occult Blood Negative Negative    Micro Urine Req see below     WBC 8.5 4.8 - 10.8 K/uL    RBC 4.14 (L) 4.20 - 5.40 M/uL    Hemoglobin 11.0 (L) 12.0 - 16.0 g/dL    Hematocrit 33.5 (L) 37.0 - 47.0 %    MCV 80.9 (L) 81.4 - 97.8 fL    MCH 26.6 (L) 27.0 - 33.0 pg    MCHC 32.8 (L) 33.6 - 35.0 g/dL    RDW 43.2 35.9 - 50.0 fL    Platelet Count 229 164 - 446 K/uL    MPV 10.1 9.0 - 12.9 fL    Neutrophils-Polys 76.80 (H) 44.00 - 72.00 %    Lymphocytes 16.60 (L) 22.00 - 41.00 %    Monocytes 4.70 0.00 - 13.40 %    Eosinophils 0.90 0.00 - 6.90 %    Basophils 0.10 0.00 - 1.80 %    Immature Granulocytes 0.90 0.00 - 0.90 %    Nucleated RBC 0.00 /100 WBC    Neutrophils (Absolute) 6.49 2.00 - 7.15 K/uL    Lymphs (Absolute) 1.41 1.00 - 4.80 K/uL    Monos (Absolute) 0.40 0.00 - 0.85 K/uL    Eos (Absolute) 0.08 0.00 - 0.51 K/uL    Baso (Absolute) 0.01 0.00 - 0.12 K/uL    Immature Granulocytes (abs) 0.08 0.00 - 0.11 K/uL    NRBC (Absolute) 0.00 K/uL    Rubella IgG Antibody 174.00 IU/mL    Hepatitis B Surface Antigen Non-Reactive Non-Reactive    Syphilis, Treponemal Qual Non-Reactive Non-Reactive   HIV AG/AB COMBO ASSAY SCREENING    Collection Time: 09/25/21  1:58 PM   Result Value Ref Range    HIV Ag/Ab Combo Assay Non-Reactive Non Reactive   OP Prenatal Panel-Blood Bank    Collection Time: 09/25/21  1:58 PM   Result Value Ref Range    ABO Grouping Only O     Rh Grouping Only POS     Antibody Screen Scrn NEG    ESTIMATED GFR    Collection Time: 09/25/21  1:58 PM   Result Value Ref Range    GFR If African American >60 >60 mL/min/1.73 m 2    GFR If Non African American >60 >60 mL/min/1.73 m 2   GRP B STREP, BY PCR (SAEED BROTH)    Collection Time: 10/13/21  4:29 PM    Specimen: Genital   Result Value Ref Range    Strep Gp B DNA PCR Negative Negative   GTT  (GESTATIONAL GLUCOSE 3 HR)    Collection Time: 10/16/21 10:30 AM   Result Value Ref Range    Baseline  Glucose 108 (H) 65 - 95 mg/dL    Glucose 1 Hour 171 65 - 180 mg/dL    Glucose 2 Hour 154 65 - 155 mg/dL    Glucose 3 Hour 107 65 - 140 mg/dL   CBC WITH DIFFERENTIAL    Collection Time: 10/31/21 11:00 AM   Result Value Ref Range    WBC 8.0 4.8 - 10.8 K/uL    RBC 4.77 4.20 - 5.40 M/uL    Hemoglobin 11.3 (L) 12.0 - 16.0 g/dL    Hematocrit 36.2 (L) 37.0 - 47.0 %    MCV 75.9 (L) 81.4 - 97.8 fL    MCH 23.7 (L) 27.0 - 33.0 pg    MCHC 31.2 (L) 33.6 - 35.0 g/dL    RDW 43.4 35.9 - 50.0 fL    Platelet Count 247 164 - 446 K/uL    MPV 10.3 9.0 - 12.9 fL    Neutrophils-Polys 67.20 44.00 - 72.00 %    Lymphocytes 25.90 22.00 - 41.00 %    Monocytes 4.70 0.00 - 13.40 %    Eosinophils 1.30 0.00 - 6.90 %    Basophils 0.30 0.00 - 1.80 %    Immature Granulocytes 0.60 0.00 - 0.90 %    Nucleated RBC 0.00 /100 WBC    Neutrophils (Absolute) 5.35 2.00 - 7.15 K/uL    Lymphs (Absolute) 2.06 1.00 - 4.80 K/uL    Monos (Absolute) 0.37 0.00 - 0.85 K/uL    Eos (Absolute) 0.10 0.00 - 0.51 K/uL    Baso (Absolute) 0.02 0.00 - 0.12 K/uL    Immature Granulocytes (abs) 0.05 0.00 - 0.11 K/uL    NRBC (Absolute) 0.00 K/uL   HOLD BLOOD BANK SPECIMEN (NOT TESTED)    Collection Time: 10/31/21 11:00 AM   Result Value Ref Range    Holding Tube - Bb DONE    Comp Metabolic Panel    Collection Time: 10/31/21 11:00 AM   Result Value Ref Range    Sodium 135 135 - 145 mmol/L    Potassium 3.6 3.6 - 5.5 mmol/L    Chloride 104 96 - 112 mmol/L    Co2 16 (L) 20 - 33 mmol/L    Anion Gap 15.0 7.0 - 16.0    Glucose 119 (H) 65 - 99 mg/dL    Bun 4 (L) 8 - 22 mg/dL    Creatinine 0.34 (L) 0.50 - 1.40 mg/dL    Calcium 9.1 8.5 - 10.5 mg/dL    AST(SGOT) 13 12 - 45 U/L    ALT(SGPT) 5 2 - 50 U/L    Alkaline Phosphatase 154 (H) 30 - 99 U/L    Total Bilirubin 0.7 0.1 - 1.5 mg/dL    Albumin 3.7 3.2 - 4.9 g/dL    Total Protein 7.1 6.0 - 8.2 g/dL    Globulin 3.4 1.9 - 3.5 g/dL    A-G Ratio 1.1 g/dL   URIC ACID    Collection Time: 10/31/21 11:00 AM   Result Value Ref Range    Uric Acid 4.6  1.9 - 8.2 mg/dL   ESTIMATED GFR    Collection Time: 10/31/21 11:00 AM   Result Value Ref Range    GFR If African American >60 >60 mL/min/1.73 m 2    GFR If Non African American >60 >60 mL/min/1.73 m 2   SARS-COV Antigen REMEDIOS: Collect dry nasal swab    Collection Time: 10/31/21 11:10 AM   Result Value Ref Range    SARS-CoV-2 Source Nasal Swab     SARS-COV ANTIGEN REMEDIOS NotDetected Not-Detected        Assessment:   Lay Cooper at 38w3d  Labor status: Active phase labor.  Prior  x1, prior vaginal birth after  x1  Desires trial of labor after     Obstetrical history significant for   Patient Active Problem List    Diagnosis Date Noted   • Supervision of other high risk pregnancy, antepartum 10/13/2021   • Abnormal GTT (glucose tolerance test) 2021   • Abnormal pregnancy US 09/10/2021   • Short interval between pregnancies affecting pregnancy 2021   • History of tuberculosis 2021   • History of blood transfusion due to anemia after  2021   • History of  2021   • History of pre-eclampsia 2021   .   EFW:   Plan:     Admit to L&D  GBS negative  Fetal monitoring/toco we will admit for labor management.  Would like epidural for pain control.    Patient has a history of previous  delivery and is desiring trial of labor after . Discussed with patient today are the risks of trial of labor after  which include uterine rupture risk of one in 100. Discussed with patient in the event of uterine rupture, immediate emergency  delivery will be performed. There is no guarantee that  can be performed an adequate amount of time to prevent fetal brain damage or death. Also discussed with patient increase risks of hysterectomy and blood transfusion associated with failed vaginal birth after . Patient understands risks as described and agrees to continue at this time.    All questions answered

## 2021-10-31 NOTE — PROGRESS NOTES
EDC  38 3     1045-pt presents from home with c/o uc since this am, no c/o leaking, bleeding, or other pain, states baby is moving normally, placed on external monitors, vs taken, SVE /-2, report given to Dr Au, admission order received  1100-iv started, labs drawn and sent  1145-epidural placed per Dr Harden, tolerated well  1224-SROM clear fluid  1226-SVE complete/+3, Dr Au called into the room  1233- viable female per Dr Au  1236-placenta  1510-up to BR, voided, pericare done, pads placed, new gown  1530-transfer to PP, report given to Alethea BECKETT, POC discussed

## 2021-10-31 NOTE — ANESTHESIA POSTPROCEDURE EVALUATION
Patient: Lay Cooper    Procedure Summary     Date: 10/31/21 Room / Location:     Anesthesia Start: 1134 Anesthesia Stop: 1233    Procedure: Labor Epidural Diagnosis:     Scheduled Providers:  Responsible Provider: Inna Harden M.D.    Anesthesia Type: epidural ASA Status: 2          Final Anesthesia Type: epidural  Last vitals  BP   Blood Pressure: 131/83    Temp   36.7 °C (98.1 °F)    Pulse   (!) 114   Resp   16    SpO2   98 %      Anesthesia Post Evaluation    Patient location during evaluation: PACU  Patient participation: complete - patient participated  Level of consciousness: awake and alert  Pain score: 2    Airway patency: patent  Anesthetic complications: no  Cardiovascular status: hemodynamically stable  Respiratory status: acceptable  Hydration status: euvolemic    PONV: none          No complications documented.     Nurse Pain Score: 2 (NPRS)

## 2021-10-31 NOTE — L&D DELIVERY NOTE
Vaginal Delivery Procedure Note:    Lay Cooper is a 31 y.o.     Weeks of gestation: 38 weeks and 3 days  Diagnosis: Labor     of viable female infant over intact perineum. Vertex, . Nuchal cord none. Anterior and posterior shoulders delivered with ease. Nose and mouth suctioned with bulb. Infant placed on maternal abdomen. Delayed cord clamp performed. Cord then clamped and cut by FOB.  APGARs 8 and 9  Birth weight pending  Placenta delivered intact with fundal massage and gentle cord traction. 3 Vessel cord.  Laceration: None  Excellent hemostasis.   mL  Anesthesia - epidural  Sponge and needle counts correct.  Patient tolerated procedure well. Mother and baby bonding skin to skin.    31-year-old  3 para 1-1-0-2 at 30 weeks 3 days gestational age.  Patient presents to labor delivery complaining of contractions.  Found to be approximately 8 cm, 100% effaced and 0 station.  She then admitted for labor management.    Patient has history of 1 prior  and one prior vaginal birth after .  She was counseled on the risks and benefits of another trial of labor after  versus  and opted to undergo another trial of labor after .    After admission, patient received an epidural for pain management and quickly proceed to complete dilation.  She then underwent spontaneous rupture of membranes and began expulsive efforts    Has had began to crown, shallow sterile drape placed on her buttocks.  Evaluation showed the infant to be in OA positioning.  The head was then delivered to a sterile field and restituted to maternal right.  The rest of the infant was then delivered and placed on mother's abdomen.    After 30 seconds, the cord was clamped and cut.    Placenta was then spontaneous delivered.  It was noted to be a velamentous cord insertion.    Pitocin infusion was then started.  Adequate urine tone was seen.  Evaluation of the cervix, vagina perineum  showed no lacerations.    Sponge and instrument counts correct x2.    Complications none    Mother and baby doing well in delivery room

## 2021-10-31 NOTE — ANESTHESIA PREPROCEDURE EVALUATION
Relevant Problems   PULMONARY   (positive) History of tuberculosis      NEURO   (positive) History of pre-eclampsia   (positive) History of tuberculosis       Physical Exam    Airway   Mallampati: III  TM distance: >3 FB  Neck ROM: full       Cardiovascular - normal exam  Rhythm: regular  Rate: normal  (-) murmur     Dental - normal exam           Pulmonary - normal exam  Breath sounds clear to auscultation     Abdominal    Neurological - normal exam                 Anesthesia Plan    ASA 2       Plan - epidural   Neuraxial block will be labor analgesia                  Pertinent diagnostic labs and testing reviewed    Informed Consent:    Anesthetic plan and risks discussed with patient.

## 2021-11-01 VITALS
SYSTOLIC BLOOD PRESSURE: 124 MMHG | BODY MASS INDEX: 33.8 KG/M2 | TEMPERATURE: 97.8 F | HEART RATE: 114 BPM | HEIGHT: 64 IN | OXYGEN SATURATION: 98 % | DIASTOLIC BLOOD PRESSURE: 78 MMHG | WEIGHT: 198 LBS | RESPIRATION RATE: 18 BRPM

## 2021-11-01 LAB
ERYTHROCYTE [DISTWIDTH] IN BLOOD BY AUTOMATED COUNT: 43.4 FL (ref 35.9–50)
HCT VFR BLD AUTO: 31.5 % (ref 37–47)
HGB BLD-MCNC: 9.8 G/DL (ref 12–16)
MCH RBC QN AUTO: 23.8 PG (ref 27–33)
MCHC RBC AUTO-ENTMCNC: 31.1 G/DL (ref 33.6–35)
MCV RBC AUTO: 76.5 FL (ref 81.4–97.8)
PLATELET # BLD AUTO: 214 K/UL (ref 164–446)
PMV BLD AUTO: 10.3 FL (ref 9–12.9)
RBC # BLD AUTO: 4.12 M/UL (ref 4.2–5.4)
WBC # BLD AUTO: 10.3 K/UL (ref 4.8–10.8)

## 2021-11-01 PROCEDURE — 36415 COLL VENOUS BLD VENIPUNCTURE: CPT

## 2021-11-01 PROCEDURE — A9270 NON-COVERED ITEM OR SERVICE: HCPCS | Performed by: ADVANCED PRACTICE MIDWIFE

## 2021-11-01 PROCEDURE — 700102 HCHG RX REV CODE 250 W/ 637 OVERRIDE(OP): Performed by: ADVANCED PRACTICE MIDWIFE

## 2021-11-01 PROCEDURE — 85027 COMPLETE CBC AUTOMATED: CPT

## 2021-11-01 RX ORDER — IBUPROFEN 600 MG/1
600 TABLET ORAL EVERY 6 HOURS PRN
Qty: 30 TABLET | Refills: 0 | Status: SHIPPED | OUTPATIENT
Start: 2021-11-01

## 2021-11-01 RX ADMIN — HYDROCODONE BITARTRATE AND ACETAMINOPHEN 1 TABLET: 5; 325 TABLET ORAL at 09:18

## 2021-11-01 RX ADMIN — IBUPROFEN 600 MG: 600 TABLET, FILM COATED ORAL at 04:06

## 2021-11-01 ASSESSMENT — EDINBURGH POSTNATAL DEPRESSION SCALE (EPDS)
I HAVE BEEN ANXIOUS OR WORRIED FOR NO GOOD REASON: YES, SOMETIMES
I HAVE BEEN SO UNHAPPY THAT I HAVE HAD DIFFICULTY SLEEPING: YES, SOMETIMES
THE THOUGHT OF HARMING MYSELF HAS OCCURRED TO ME: NEVER
I HAVE LOOKED FORWARD WITH ENJOYMENT TO THINGS: AS MUCH AS I EVER DID
THINGS HAVE BEEN GETTING ON TOP OF ME: YES, MOST OF THE TIME I HAVEN'T BEEN ABLE TO COPE AT ALL
I HAVE BEEN SO UNHAPPY THAT I HAVE BEEN CRYING: YES, QUITE OFTEN
I HAVE FELT SAD OR MISERABLE: YES, QUITE OFTEN
I HAVE BLAMED MYSELF UNNECESSARILY WHEN THINGS WENT WRONG: NOT VERY OFTEN
I HAVE FELT SCARED OR PANICKY FOR NO GOOD REASON: YES, SOMETIMES
I HAVE BEEN ABLE TO LAUGH AND SEE THE FUNNY SIDE OF THINGS: NOT QUITE SO MUCH NOW

## 2021-11-01 ASSESSMENT — PAIN DESCRIPTION - PAIN TYPE: TYPE: ACUTE PAIN

## 2021-11-01 NOTE — PROGRESS NOTES
Patient assessed VS stable. Pain reported as 4/10 on pain scale for cramping. Breasts soft. Fundus firm 1 below U, lochia light rubra. Legs show no signs of swelling, heat, redness, pain. All questions and concerns answered at this time. Bed rails up x 2. Call light in reach. Patient belongings in reach. Will continue to monitor.

## 2021-11-01 NOTE — DISCHARGE SUMMARY
NORMAL SPONTANEOUS VAGINAL DISCHARGE SUMMARY    PATIENT ID:  NAME:  Lay Cooper  MRN:               [id number]  YOB: 1990    DATE OF ADMISSION: 10/31/2021    DATE OF DISCHARGE: 2021     ADMITTING DIAGNOSIS: Intrauterine pregnancy at 38w3d.    DISCHARGE DIAGNOSIS: s/p     HOSPITAL COURSE: This is a 31 y.o. year old female admitted at [place] [address] at 38w3d who presented with contractions, no LOF, no vaginal bleeding, normal FM and in active labor. Pt was 8 cm dilated, 100% effaced and at  +2 station on sterile vaginal exam. Pregnancy was complicated by abnormal GTT and hx of . The patient had a good labor pattern after admission and proceeded to deliver a viable female infant weighing  7 lbs and 14.1 oz. Infants Apgars scores were 8 and 8 at one and five minutes. The patients postpartum course was uncomplicated and she was discharged home in stable condition on postpartum day #1.    PROCEDURES PERFORMED: Normal spontaneous vaginal delivery.    COMPLICATIONS: None    DIET: Regular    ACTIVITY: No intercourse and nothing inserted into the vagina for 6 weeks.    MEDICATIONS:     Medication List      START taking these medications      Instructions   ibuprofen 600 MG Tabs  Commonly known as: MOTRIN   Take 1 Tablet by mouth every 6 hours as needed (cramping).  Dose: 600 mg                FOLLOWUP:  1) [facility] in 3 weeks  2) Return to the hospital if copious vaginal bleeding or foul smelling discharge is noted    Young Bundy M.D.

## 2021-11-01 NOTE — PROGRESS NOTES
Patient received from labor and delivery via wheelchair to room S311. Bedside report received from Farnaz Nguyen RN, assumed care of patient.  Patient oriented to room and surroundings, call system, visiting policy, infant security including ID bands, not letting anyone take infant without photo ID, no sleeping with infant, no carrying infant in halls, no leaving infant unattended.  0-10 pain scale and pain management discussed.  Patient instructed to call for assistance before getting out of bed until stable.  Assessment done.

## 2021-11-01 NOTE — CARE PLAN
The patient is Stable - Low risk of patient condition declining or worsening           Problem: Altered Physiologic Condition  Goal: Patient physiologically stable as evidenced by normal lochia, palpable uterine involution and vitals within normal limits  Outcome: Progressing  Note: Patient VS stable and within defined limits. Fundus firm 1 below U, lochia light rubra at time of assessment.      Problem: Infection - Postpartum  Goal: Postpartum patient will be free of signs and symptoms of infection  Outcome: Progressing  Note: Patient is showing no signs or symptoms of infection at time of assessment.

## 2021-11-01 NOTE — DISCHARGE PLANNING
Discharge Planning Assessment Post Partum    Reason for Referral: MOB scored a 15 on the EPDS screen  Address: [address] [place] [id number]  Phone: [phone]  Type of Living Situation: living with FOB and children  Mom Diagnosis: Pregnancy  Baby Diagnosis: -38.3 weeks  Primary Language: Parents speak English    Name of Baby: Jessa Cooper (: 10/31/21)  Father of the Baby: Kieran Cooper   Involved in baby’s care? Yes  Contact Information: [phone]    Prenatal Care: Yes  Mom's PCP: None  PCP for new baby: Pediatrician list provided to parents    Support System: FOB  Coping/Bonding between mother & baby: Yes  Source of Feeding: breast feeding  Supplies for Infant: prepared for infant; denies any needs    Mom's Insurance: Access to Healthcare and MOB stated she met with Lilian to apply for Medicaid for baby and 10 month old  Baby Covered on Insurance: Not currently-Medicaid pending  Mother Employed/School: No  Other children in the home/names & ages: 9 year old daughter and 10 month old son    Financial Hardship/Income: FOB is employed   Mom's Mental status: alert and oriented  Services used prior to admit: WIC referral was sent by Lactation RN    CPS History: No  Psychiatric History: No.  MOB scored a 15 on the EPDS screen.  Discussed with mother who stated she is feeling overwhelmed with having a new baby and two children at home.  MOB states the FOB is involved and supportive.  Provided MOB with a list of counseling and support group resources.  Recommended that MOB follow-up with her OB if her symptoms continue  Domestic Violence History: No  Drug/ETOH History: No    Resources Provided: pediatrician list, children and family resource list, and post partum support and counseling resources were provided to parents  Referrals Made: diaper bank referral provided     Clearance for Discharge: Infant is cleared to discharge home with parents

## 2021-11-02 NOTE — DISCHARGE INSTRUCTIONS
PATIENT DISCHARGE EDUCATION INSTRUCTION SHEET    REASONS TO CALL YOUR OBSTETRICIAN  · Persistent fever, shaking, chills (Temperature higher than 100.4) may indicate you have an infection  · Heavy bleeding: soaking more than 1 pad per hour; Passing clots an egg-sized clot or bigger may mean you have an postpartum hemorrhage  · Foul odor from vagina or bad smelling or discolored discharge or blood  · Breast infection (Mastitis symptoms); breast pain, chills, fever, redness or red streaks, may feel flu like symptoms  · Urinary pain, burning or frequency  · Incision that is not healing, increased redness, swelling, tenderness or pain, or any pus from episiotomy or  site may mean you have an infection  · Redness, swelling, warmth, or painful to touch in the calf area of your leg may mean you have a blood clot  · Severe or intensified depression, thoughts or feelings of wanting to hurt yourself or someone else   · Pain in chest, obstructed breathing or shortness of breath (trouble catching your breath) may mean you are having a postpartum complication. Call your provider immediately   · Headache that does not get better, even after taking medicine, a bad headache with vision changes or pain in the upper right area of your belly may mean you have high blood pressure or post birth preeclampsia. Call your provider immediately    HAND WASHING  All family and friends should wash their hands:  · Before and after holding the baby  · Before feeding the baby  · After using the restroom or changing the baby's diaper    WOUND CARE  Ask your physician for additional care instructions. In general:  Do NOT lift anything heavier than your baby (over 10 pounds)  Encourage family to help participate in care of the  to allow rest and mom time to heal  · Episiotomy/Laceration  · May use mera-spray bottle, witch hazel pads and dermaplast spray for comfort  · Use mera-spray bottle after urinating to cleanse perineal area  · To  prevent burning during urination spray mera-water bottle on labial area   · Pat perineal area dry until episiotomy/laceration is healed  · Continue to use mera-bottle until bleeding stops as needed  · If have a 2nd degree laceration or greater, a Sitz bath can offer relief from soreness, burning, and inflammation   · Sitz Bath   · Sit in 6 inches of warm water and soak laceration as needed until the laceration heals    VAGINAL CARE AND BLEEDING  · Nothing inside vagina for 6 weeks:   · No sexual intercourse, tampons or douching  · Bleeding may continue for 2-4 weeks. Amount and color may vary  · Soaking 1 pad or more in an hour for several hours is considered heavy bleeding  · Passing large egg sized blood clots can be concerning  · If you feel like you have heavy bleeding or are having increasing amount of blood clots call your Obstetrician immediately  · If you begin feeling faint upon standing, feeling sick to your stomach, have clammy skin, a really fast heartbeat, have chills, start feeling confused, dizzy, sleepy or weak, or feeling like you're going to faint call your Obstetrician immediately    HYPERTENSION   Preeclampsia or gestational hypertension are types of high blood pressure that only pregnant women can get. It is important for you to be aware of symptoms to seek early intervention and treatment. If you have any of these symptoms immediately call your Obstetrician    · Vision changes or blurred vision   · Severe headache or pain that is unrelieved with medication and will not go away  · Persistent pain in upper abdomen or shoulder   · Increased swelling of face, feet, or hands  · Difficulty breathing or shortness of breath at rest  · Urinating less than usual    URINATION AND BOWEL MOVEMENTS  · Eating more fiber (bran cereal, fruits, and vegetables) and drinking plenty of fluids will help to avoid constipation  · Urinary frequency and urgency after childbirth is normal  · If you experience any urinary  "pain, burning or frequency call your provider    BIRTH CONTROL  · It is possible to become pregnant at any time after delivery and while breastfeeding  · Plan to discuss a method of birth control with your physician at your post delivery follow up visit    POSTPARTUM BLUES  During the first few days after birth, you may experience a sense of the \"blues\" which may include impatience, irritability or even crying. These feelings come and go quickly. However, as many as 1 in 10 women experience emotional symptoms known as postpartum depression.     POSTPARTUM DEPRESSION  May start as early as the second or third day after delivery or take several weeks or months to develop. Symptoms of \"blues\" are present, but are more intense: Crying spells; loss of appetite; feelings of hopelessness or loss of control; fear of touching the baby; over concern or no concern at all about the baby; little or no concern about your own appearance/caring for yourself; and/or inability to sleep or excessive sleeping. Contact your Obstetrician if you are experiencing any of these symptoms     PREVENTING SHAKEN BABY  If you are angry or stressed, PUT THE BABY IN THE CRIB, step away, take some deep breaths, and wait until you are calm to care for the baby. DO NOT SHAKE THE BABY. You are not alone, call a supporter for help.  · [facility] 24/7 crisis call line (256-114-3253) or (1-869.623.8894)  · You can also text them, text \"ANSWER\" (152619)      "

## 2021-11-02 NOTE — PROGRESS NOTES
Educated patient about self care and  care, all questions answered. Pt. Bonding well with infant, educated about feeding times and amount. Pt. Discharged home, escorted to car. Will follow up with [facility].

## 2021-11-02 NOTE — CARE PLAN
The patient is Stable - Low risk of patient condition declining or worsening    Shift Goals  Clinical Goals: Maintain fundus firm, lochia light; pain management  Patient Goals: discharge    Progress made toward(s) clinical / shift goals:  MET

## 2021-11-02 NOTE — PROGRESS NOTES
0702- Report received from SUJIT Villagomez.  Assumed care of patient.  0857- Patient medicated for c/o lower abdominal cramping and back pain.  Patient stated she is tired and would like to rest at this time.  1020- Patient assessment done.  Patient stated that she is voiding without difficulty and passing flatus.  Patient denied dizziness and stated that she is walking without difficulty.  Discussed pain management plan.  Patient will call when pain intervention needed.  Reviewed plan of care.  Patient verbalized understanding.  1440- Patient stated desire for discharge home today and was encouraged to read the written patient discharge education/instruction sheet.  Patient wants to shower before discharge.  Patient's EPDS score = 15.  Dr. Bundy notified.  Social service consult placed.  1728- Heart rate = 114.  Patient denied chest pain and denied shortness of breath.  Patient reported feeling nasal congestion.    1748- Dr. Bundy notified, and notified that patient has had several elevated heart rates since 0600 today.  Per Dr. Bundy, patient may discharge home with follow up in the office in one week.

## 2021-12-08 ENCOUNTER — POST PARTUM (OUTPATIENT)
Dept: OBGYN | Facility: CLINIC | Age: 31
End: 2021-12-08
Payer: COMMERCIAL

## 2021-12-08 VITALS — SYSTOLIC BLOOD PRESSURE: 120 MMHG | DIASTOLIC BLOOD PRESSURE: 80 MMHG | WEIGHT: 189.6 LBS | BODY MASS INDEX: 32.54 KG/M2

## 2021-12-08 PROCEDURE — 0503F POSTPARTUM CARE VISIT: CPT | Performed by: NURSE PRACTITIONER

## 2021-12-08 ASSESSMENT — ENCOUNTER SYMPTOMS
GASTROINTESTINAL NEGATIVE: 1
CARDIOVASCULAR NEGATIVE: 1
RESPIRATORY NEGATIVE: 1
MUSCULOSKELETAL NEGATIVE: 1
NEUROLOGICAL NEGATIVE: 1
PSYCHIATRIC NEGATIVE: 1
CONSTITUTIONAL NEGATIVE: 1
EYES NEGATIVE: 1

## 2021-12-08 ASSESSMENT — FIBROSIS 4 INDEX: FIB4 SCORE: 0.79

## 2021-12-08 NOTE — PROGRESS NOTES
Subjective     Lay Cooper is a 31 y.o.  female who presents for her postpartum exam. She had a  without complication. Her prenatal course was complicated by hx of C/S, hx of preE and short intervals between pregnancies. Eating a regular diet without difficulty. Bowel movement are Normal.  The patient is not having any pain. Vaginal bleeding: Spotting. Patient Denies Incisional pain, drainage or redness.  She is breastfeeding. She desires a Paragard IUD for her birth control method. Reports no sex prior to this appointment.  Denies any S/S of PP depression.    Assessment   Assessment:    1. PP care of lactating women   2. Exam WNL   3. Pap WNL on 7/10/20  4. Desires contraception   5. EPDS score: 4    Patient Active Problem List    Diagnosis Date Noted   • Supervision of other high risk pregnancy, antepartum 10/13/2021   • Abnormal GTT (glucose tolerance test) 2021   • Abnormal pregnancy US 09/10/2021   • Short interval between pregnancies affecting pregnancy 2021   • History of tuberculosis 2021   • History of blood transfusion due to anemia after  2021   • History of  2021   • History of pre-eclampsia 2021       Plan   Plan:    1. Breastfeeding support   2. Continue PNV   3. Contraceptive counseling - Referral for IUD under danielle sent to [facility]  4. Encouraged pelvic rest until IUD placement  5. Discussed diet, exercise and resumption of sexual activity   6. Gave copy of pap   7.  F/u c PCP or [facility] as needed for primary care needs.   HPI    Review of Systems   Constitutional: Negative.    HENT: Negative.    Eyes: Negative.    Respiratory: Negative.    Cardiovascular: Negative.    Gastrointestinal: Negative.    Genitourinary: Negative.    Musculoskeletal: Negative.    Skin: Negative.    Neurological: Negative.    Endo/Heme/Allergies: Negative.    Psychiatric/Behavioral: Negative.               Objective     /80   Wt 86 kg (189 lb 9.6 oz)    LMP 02/04/2021 (Exact Date)   BMI 32.54 kg/m²      Physical Exam  Constitutional:       Appearance: She is well-developed.   Pulmonary:      Effort: Pulmonary effort is normal.   Abdominal:      Palpations: Abdomen is soft.   Genitourinary:     Exam position: Supine.      Labia:         Right: No rash, tenderness, lesion or injury.         Left: No rash, tenderness, lesion or injury.       Vagina: Normal. No signs of injury. No vaginal discharge, erythema, tenderness or bleeding.      Rectum: No tenderness.   Skin:     General: Skin is warm and dry.   Neurological:      Mental Status: She is alert and oriented to person, place, and time.   Psychiatric:         Behavior: Behavior normal.         Thought Content: Thought content normal.         Judgment: Judgment normal.                             Assessment & Plan        There are no diagnoses linked to this encounter.

## 2021-12-08 NOTE — PROGRESS NOTES
Pt here today for postpartum exam.  Delivery type: 10/31/2021 Vaginal   Currently : Breast feeding   Self pay: [facility] for [facility] given to patient.  Desired BCM: Not sure   LMP: N/a  Last pap: 2020 in [place] [facility]   Phone # [phone]

## 2021-12-13 ENCOUNTER — TELEPHONE (OUTPATIENT)
Dept: OBGYN | Facility: CLINIC | Age: 31
End: 2021-12-13

## 2022-01-05 ENCOUNTER — GYNECOLOGY VISIT (OUTPATIENT)
Dept: OBGYN | Facility: CLINIC | Age: 32
End: 2022-01-05
Payer: OTHER GOVERNMENT

## 2022-01-05 VITALS
BODY MASS INDEX: 32.78 KG/M2 | SYSTOLIC BLOOD PRESSURE: 112 MMHG | HEIGHT: 64 IN | DIASTOLIC BLOOD PRESSURE: 70 MMHG | WEIGHT: 192 LBS

## 2022-01-05 PROBLEM — O09.899 SUPERVISION OF OTHER HIGH RISK PREGNANCY, ANTEPARTUM: Status: RESOLVED | Noted: 2021-10-13 | Resolved: 2022-01-05

## 2022-01-05 PROBLEM — O09.899 SHORT INTERVAL BETWEEN PREGNANCIES AFFECTING PREGNANCY, ANTEPARTUM: Status: RESOLVED | Noted: 2021-09-01 | Resolved: 2022-01-05

## 2022-01-05 PROBLEM — Z92.89 HISTORY OF BLOOD TRANSFUSION: Status: RESOLVED | Noted: 2021-09-01 | Resolved: 2022-01-05

## 2022-01-05 LAB
INT CON NEG: NEGATIVE
INT CON POS: POSITIVE
POC URINE PREGNANCY TEST: NEGATIVE

## 2022-01-05 PROCEDURE — 81025 URINE PREGNANCY TEST: CPT | Performed by: NURSE PRACTITIONER

## 2022-01-05 ASSESSMENT — FIBROSIS 4 INDEX: FIB4 SCORE: 0.79

## 2022-01-05 NOTE — PROGRESS NOTES
GYN visit for Pargard IUD Insertion. Consent signed  LMP: 2021  Had  10/31/2021  Breast and formula feeding   WT:192 lb  BP: 112/70  Good # [phone]

## 2022-01-05 NOTE — PROGRESS NOTES
Pt here for IUD insertion, however she had sex 3 days ago. I was explained to her the inaccuracy of UPT under 2 weeks. She is agreeable to reschedule her appt in 2 wks.  Pt understands she is not to engage in sexual activity ukntil after her procedure

## 2022-01-19 ENCOUNTER — GYNECOLOGY VISIT (OUTPATIENT)
Dept: OBGYN | Facility: CLINIC | Age: 32
End: 2022-01-19
Payer: COMMERCIAL

## 2022-01-19 VITALS
HEIGHT: 64 IN | DIASTOLIC BLOOD PRESSURE: 78 MMHG | BODY MASS INDEX: 33.05 KG/M2 | WEIGHT: 193.6 LBS | SYSTOLIC BLOOD PRESSURE: 118 MMHG

## 2022-01-19 DIAGNOSIS — Z30.017 NEXPLANON INSERTION: ICD-10-CM

## 2022-01-19 DIAGNOSIS — Z30.014 ENCOUNTER FOR INITIAL PRESCRIPTION OF INTRAUTERINE CONTRACEPTIVE DEVICE (IUD): Primary | ICD-10-CM

## 2022-01-19 LAB
INT CON NEG: NORMAL
INT CON POS: NORMAL
POC URINE PREGNANCY TEST: NEGATIVE

## 2022-01-19 PROCEDURE — 11981 INSERTION DRUG DLVR IMPLANT: CPT | Performed by: NURSE PRACTITIONER

## 2022-01-19 PROCEDURE — 81025 URINE PREGNANCY TEST: CPT | Performed by: NURSE PRACTITIONER

## 2022-01-19 RX ORDER — COPPER 313.4 MG/1
1 INTRAUTERINE DEVICE INTRAUTERINE ONCE
Status: DISCONTINUED | OUTPATIENT
Start: 2022-01-19 | End: 2022-01-19

## 2022-01-19 ASSESSMENT — FIBROSIS 4 INDEX: FIB4 SCORE: 0.79

## 2022-01-19 NOTE — PROCEDURES
IUD Insertion    Date/Time: 1/19/2022 1:43 PM  Performed by: LEANN Santiago  Authorized by: LEANN Santiago     Consent:     Consent obtained:  Verbal and written    Consent given by:  Patient    Procedure risks and benefits discussed: yes      Patient questions answered: yes      Patient agrees, verbalizes understanding, and wants to proceed: yes      Educational handouts given: yes      Instructions and paperwork completed: yes    Pre-procedure details:     Negative urine pregnancy test: yes    Procedure:     Pelvic exam performed: yes      Sterile speculum placed in vagina: yes      Cervix visualized: yes      Cervix cleaned and prepped in sterile fashion: yes      Tenaculum applied to cervix: yes      Dilation needed: no      Uterus sound depth (cm):  8    IUD inserted with no complications: yes (pt unable to tolerate procedure, switched BCM to Nexplanon placement)    General Procedure    Date/Time: 1/19/2022 1:44 PM  Performed by: LEANN Santiago  Authorized by: LEANN Santiago     Nexplanon Placement    Date/Time: 1/19/2022 2:17 PM  Performed by: LEANN Santiago  Authorized by: LEANN Santiago   Preparation: Patient was prepped and draped in the usual sterile fashion.  Local anesthesia used: yes    Anesthesia:  Local anesthesia used: yes  Local Anesthetic: lidocaine 1% without epinephrine  Anesthetic total: 4 mL    Sedation:  Patient sedated: no    Patient tolerance: patient tolerated the procedure well with no immediate complications        Procedure note; Nexplanon insertion;    Informed consent obtained, consent signed-discussed the risks of Nexplanon; pain, bleeding, infection, bruising, possible pregnancy, difficulty with removing implant, possible scarring to arm.   All the risks and benefits of the procedure and the device are explained and patient verbalizes understanding and signs the consent     Urine hCG negative    Patient positioned supine  with nondominant arm exposed.    Medial epicondyle of LT arm palpated    Surgical michael placed 8-10 cm medial to the medial epicondyle    Betadine prep the skin    Local anesthesia-1% lidocaine injected using a 1 1/2 inch 27 gauge needle     Implant inserted via the Nexplanon insertion device subdermally in a sterile fashion    The patient and provider can feel the implant under the skin and the blue end of the insertion device is present indicating implant insertion    Steristrip and sterile 4x4's     Pressure bandage placed    Patient instructed to remove dressing  in 24 hours    Patient instructed to use a band-aid for 2 days there after    Patient tolerated the procedure well    Return to Clinic for:  Pain, redness or bleeding at the insertion site   Any purulent drainage (puss)  Heavy bleeding (saturating a pad every 1-2 hours) that lasts more than 1-2 days   Follow up in 4 weeks for post insertion check

## 2022-01-19 NOTE — NON-PROVIDER
Patient here for a GYN follow up.   Last seen on 01/05/2022  No complaints as of today   pharmacy verified.  Patient phone #: [phone]

## 2022-01-20 ENCOUNTER — APPOINTMENT (OUTPATIENT)
Dept: OBGYN | Facility: CLINIC | Age: 32
End: 2022-01-20
Payer: COMMERCIAL